# Patient Record
Sex: MALE | Race: WHITE | URBAN - METROPOLITAN AREA
[De-identification: names, ages, dates, MRNs, and addresses within clinical notes are randomized per-mention and may not be internally consistent; named-entity substitution may affect disease eponyms.]

---

## 2022-04-26 ENCOUNTER — TELEPHONE (OUTPATIENT)
Dept: INTERNAL MEDICINE CLINIC | Age: 82
End: 2022-04-26

## 2022-04-26 NOTE — TELEPHONE ENCOUNTER
----- Message from Jeb Jenkins sent at 2022 10:04 AM EDT -----  Subject: Appointment Request    Reason for Call: New Patient Request Appointment    QUESTIONS  Type of Appointment? New Patient/New to Provider  Reason for appointment request? Requested Provider unavailable - Tiffany Ferreira  Additional Information for Provider? Patient would like to establish care   with Dr. Corina Stephen. Stated that he is aware that they would be calling to   schedule a visit with him. Please reach out to patients Daughter In Mechelle Cole to schedule please   ---------------------------------------------------------------------------  --------------  Socii  What is the best way for the office to contact you? OK to leave message on   voicemail  Preferred Call Back Phone Number? [phone]  ---------------------------------------------------------------------------  --------------  SCRIPT ANSWERS  Relationship to Patient? Other  Representative Name? Santa  Additional information verified (besides Name and Date of Birth)? Phone   Number  Specialty Confirmation? Primary Care  Is this the first appointment to establish care for a ? No  Have you been diagnosed with, awaiting test results for, or told that you   are suspected of having COVID-19 (Coronavirus)? (If patient has tested   negative or was tested as a requirement for work, school, or travel and   not based on symptoms, answer no)? No  Within the past 10 days have you developed any of the following symptoms   (answer no if symptoms have been present longer than 10 days or began   more than 10 days ago)? Fever or Chills, Cough, Shortness of breath or   difficulty breathing, Loss of taste or smell, Sore throat, Nasal   congestion, Sneezing or runny nose, Fatigue or generalized body aches   (answer no if pain is specific to a body part e.g. back pain), Diarrhea,   Headache? No  Have you had close contact with someone with COVID-19 in the last 7 days? No  (Service Expert  click yes below to proceed with [employer] As Usual   Scheduling)?  Yes

## 2022-05-16 ENCOUNTER — OFFICE VISIT (OUTPATIENT)
Dept: INTERNAL MEDICINE CLINIC | Age: 82
End: 2022-05-16
Payer: MEDICAID

## 2022-05-16 VITALS
BODY MASS INDEX: 25.77 KG/M2 | SYSTOLIC BLOOD PRESSURE: 123 MMHG | WEIGHT: 174 LBS | HEART RATE: 55 BPM | HEIGHT: 69 IN | DIASTOLIC BLOOD PRESSURE: 64 MMHG

## 2022-05-16 DIAGNOSIS — D50.0 IRON DEFICIENCY ANEMIA DUE TO CHRONIC BLOOD LOSS: ICD-10-CM

## 2022-05-16 DIAGNOSIS — E83.42 HYPOMAGNESEMIA: ICD-10-CM

## 2022-05-16 DIAGNOSIS — G20 PARKINSON DISEASE (HCC): ICD-10-CM

## 2022-05-16 DIAGNOSIS — E78.5 DYSLIPIDEMIA: ICD-10-CM

## 2022-05-16 DIAGNOSIS — Z85.79: ICD-10-CM

## 2022-05-16 DIAGNOSIS — I10 ESSENTIAL HYPERTENSION: Primary | ICD-10-CM

## 2022-05-16 DIAGNOSIS — E53.8 FOLATE DEFICIENCY: ICD-10-CM

## 2022-05-16 PROCEDURE — 99204 OFFICE O/P NEW MOD 45 MIN: CPT | Performed by: HOSPITALIST

## 2022-05-16 RX ORDER — VALSARTAN 160 MG/1
160 TABLET ORAL DAILY
Qty: 90 TABLET | Refills: 1
Start: 2022-05-16

## 2022-05-16 RX ORDER — CARBIDOPA/LEVODOPA 25MG-250MG
1 TABLET ORAL
COMMUNITY

## 2022-05-16 RX ORDER — ROSUVASTATIN CALCIUM 10 MG/1
10 TABLET, COATED ORAL DAILY
COMMUNITY

## 2022-05-16 RX ORDER — AMLODIPINE BESYLATE 5 MG/1
5 TABLET ORAL DAILY
COMMUNITY

## 2022-05-16 RX ORDER — FLUTICASONE PROPIONATE 50 MCG
1 SPRAY, SUSPENSION (ML) NASAL DAILY
COMMUNITY

## 2022-05-16 RX ORDER — LORATADINE 10 MG/1
10 CAPSULE, LIQUID FILLED ORAL DAILY
COMMUNITY

## 2022-05-16 RX ORDER — ESOMEPRAZOLE MAGNESIUM 20 MG/1
20 FOR SUSPENSION ORAL DAILY
COMMUNITY

## 2022-05-16 RX ORDER — EZETIMIBE 10 MG/1
10 TABLET ORAL DAILY
COMMUNITY

## 2022-05-16 RX ORDER — ASPIRIN 81 MG/1
81 TABLET ORAL DAILY
COMMUNITY

## 2022-05-16 SDOH — ECONOMIC STABILITY: FOOD INSECURITY: WITHIN THE PAST 12 MONTHS, THE FOOD YOU BOUGHT JUST DIDN'T LAST AND YOU DIDN'T HAVE MONEY TO GET MORE.: NEVER TRUE

## 2022-05-16 SDOH — ECONOMIC STABILITY: FOOD INSECURITY: WITHIN THE PAST 12 MONTHS, YOU WORRIED THAT YOUR FOOD WOULD RUN OUT BEFORE YOU GOT MONEY TO BUY MORE.: NEVER TRUE

## 2022-05-16 ASSESSMENT — PATIENT HEALTH QUESTIONNAIRE - PHQ9
1. LITTLE INTEREST OR PLEASURE IN DOING THINGS: 0
SUM OF ALL RESPONSES TO PHQ QUESTIONS 1-9: 0
SUM OF ALL RESPONSES TO PHQ QUESTIONS 1-9: 0
2. FEELING DOWN, DEPRESSED OR HOPELESS: 0
SUM OF ALL RESPONSES TO PHQ QUESTIONS 1-9: 0
SUM OF ALL RESPONSES TO PHQ QUESTIONS 1-9: 0
SUM OF ALL RESPONSES TO PHQ9 QUESTIONS 1 & 2: 0

## 2022-05-16 ASSESSMENT — ENCOUNTER SYMPTOMS
BACK PAIN: 1
ALLERGIC/IMMUNOLOGIC NEGATIVE: 1
RESPIRATORY NEGATIVE: 1

## 2022-05-16 ASSESSMENT — SOCIAL DETERMINANTS OF HEALTH (SDOH): HOW HARD IS IT FOR YOU TO PAY FOR THE VERY BASICS LIKE FOOD, HOUSING, MEDICAL CARE, AND HEATING?: NOT HARD AT ALL

## 2022-05-16 NOTE — PROGRESS NOTES
Outpatient Note for New Patient Visit    Patient:  Vincent Goodell                                               : 1940  Age: 81 y.o. MRN: [id number]  Date : 2022    History Obtained From:  patient, family member - son      OF PRESENT ILLNESS:   The patient is a 81 y.o. male who presents establish his medical care in our office. He immigrated 2 months ago from [facility]. He has history of Parkinson's disease which is currently well controlled with use of Sinemet 1 tablet 3 times a day. Blood pressure and dyslipidemia well controlled with current medications. Reports chronic sinus congestion and postnasal drainage secondary to vasomotor rhinitis. His seasonal allergies are better now that he lives in [place] in [place] [address] with his condition while he was in [facility]. Reports mild urinary dribbling and urinary incontinence secondary to previous history of radical prostatectomy for treatment of prostate cancer. Past Medical History:        Diagnosis Date    Acne rosacea     Chronic bilateral low back pain without sciatica     Dyslipidemia     Fatty liver     Kidney stones     [facility]     Primary hypertension     Prostate cancer ([facility]     PUD (peptic ulcer disease)     Varicose veins of bilateral lower extremities with pain     Vasomotor rhinitis        Past Surgical History:        Procedure Laterality Date    PROSTATECTOMY         Family History:   No family history on file. Social History:   TOBACCO:   reports that he quit smoking about 10 years ago. He has a 7.50 pack-year smoking history. He has never used smokeless tobacco.  ETOH:   has no history on file for alcohol use. OCCUPATION:  Retired. , has 1 adult son. Remains active, takes care of his ADLs. Allergies:  Patient has no known allergies. Current Medications:    Prior to Admission medications    Medication Sig Start Date End Date Taking?  Authorizing Provider carbidopa-levodopa (SINEMET)  MG per tablet Take 1 tablet by mouth   Yes Historical Provider, MD   esomeprazole Magnesium (NEXIUM) 20 MG PACK Take 20 mg by mouth daily   Yes Historical Provider, MD   rosuvastatin (CRESTOR) 10 MG tablet Take 10 mg by mouth daily   Yes Historical Provider, MD   ezetimibe (ZETIA) 10 MG tablet Take 10 mg by mouth daily   Yes Historical Provider, MD   BISOPROLOL FUMARATE PO Take by mouth 1.25 mg daily   Yes Historical Provider, MD   aspirin 81 MG EC tablet Take 81 mg by mouth daily   Yes Historical Provider, MD   loratadine (CLARITIN) 10 MG capsule Take 10 mg by mouth daily   Yes Historical Provider, MD   fluticasone (FLONASE) 50 MCG/ACT nasal spray 1 spray by Each Nostril route daily   Yes Historical Provider, MD   benzoyl peroxide 10 % LOTN Apply topically daily as needed   Yes Historical Provider, MD   amLODIPine (NORVASC) 5 MG tablet Take 5 mg by mouth daily   Yes Historical Provider, MD   valsartan (DIOVAN) 160 MG tablet Take 1 tablet by mouth daily 5/16/22  Yes Brinda Brooks MD       REVIEW OF SYSTEMS:  Review of Systems   Constitutional: Negative. HENT: Positive for congestion and postnasal drip. Eyes: Positive for visual disturbance (bilateral cataracts). Respiratory: Negative. Cardiovascular: Negative. Gastrointestinal:        Some heartburn   Endocrine: Negative. Genitourinary:        Mild urine incontinence as a consequence of radical prostatectomy   Musculoskeletal: Positive for back pain (chronic lower back and lower T-spine pain) and myalgias (calves. Takes Mg Oxide 400-800 mg PO daily as needed with good symptom relief.). Takes Tylenol 3 as needed for severe lower back pain   Skin:        + skin erythema secondary to acne rosacea and mycosis fungoides. Was receiving phototherapy in [facility] since 2012   Allergic/Immunologic: Negative. Neurological: Positive for tremors. Psychiatric/Behavioral: Negative.           Physical Exam: Vitals: /64   Pulse 55   Ht 5' 9.29\" (1.76 m)   Wt 174 lb (78.9 kg)   BMI 25.48 kg/m²     Body mass index is 25.48 kg/m². Wt Readings from Last 3 Encounters:   05/16/22 174 lb (78.9 kg)     Physical Exam  Vitals and nursing note reviewed. Constitutional:       General: He is not in acute distress. Appearance: Normal appearance. He is well-developed. He is not ill-appearing. HENT:      Head: Normocephalic and atraumatic. Right Ear: Tympanic membrane, ear canal and external ear normal. There is no impacted cerumen. Left Ear: Tympanic membrane, ear canal and external ear normal. There is no impacted cerumen. Nose:      Comments: Nasal is moderately swollen and mildly inflamed. Mouth/Throat:      Pharynx: No oropharyngeal exudate. Eyes:      General: No scleral icterus. Extraocular Movements: Extraocular movements intact. Pupils: Pupils are equal, round, and reactive to light. Neck:      Vascular: No JVD. Cardiovascular:      Rate and Rhythm: Normal rate and regular rhythm. Heart sounds: Normal heart sounds. No murmur heard. No friction rub. No gallop. Pulmonary:      Effort: Pulmonary effort is normal. No respiratory distress. Breath sounds: Normal breath sounds. No wheezing or rales. Chest:      Chest wall: No tenderness. Abdominal:      General: Bowel sounds are normal. There is no distension. Palpations: Abdomen is soft. Tenderness: There is no abdominal tenderness. There is no right CVA tenderness or left CVA tenderness. Musculoskeletal:         General: Normal range of motion. Cervical back: Normal range of motion. Right lower leg: No edema. Left lower leg: No edema. Skin:     General: Skin is warm and dry. Findings: Erythema (face, chest, and back) present. No lesion or rash. Neurological:      Mental Status: He is alert and oriented to person, place, and time.       Cranial Nerves: No cranial nerve deficit. Sensory: No sensory deficit. Assessment/Plan:   Jose Gao was seen today for new patient. Diagnoses and all orders for this visit:    Essential hypertension  -     CBC with Auto Differential; Future  -     Comprehensive Metabolic Panel; Future  -     Lipid Panel; Future  -     TSH; Future  -     Continue current medication    Dyslipidemia        -     Continue current medication        -     Lipid panel; Future    Hypomagnesemia  -     Magnesium; Future    Iron deficiency anemia due to chronic blood loss  -     Iron and TIBC; Future    Folate deficiency  -     Folate; Future    [facility]        -    Continue current medication    H/O mycosis fungoides        -    We will need to have dermatology evaluation for further evaluation and treat    Other orders  -     valsartan (DIOVAN) 160 MG tablet; Take 1 tablet by mouth daily            Return in about 3 months (around 8/16/2022) for HTN, dyslipidemia.      Judith Patterson MD, M.BECKI.   5/16/2022, 2:58 PM

## 2022-08-09 DIAGNOSIS — D50.0 IRON DEFICIENCY ANEMIA DUE TO CHRONIC BLOOD LOSS: ICD-10-CM

## 2022-08-09 DIAGNOSIS — E53.8 FOLATE DEFICIENCY: ICD-10-CM

## 2022-08-09 DIAGNOSIS — I10 ESSENTIAL HYPERTENSION: ICD-10-CM

## 2022-08-09 DIAGNOSIS — E83.42 HYPOMAGNESEMIA: ICD-10-CM

## 2022-08-09 LAB
BASOPHILS ABSOLUTE: 0 K/UL (ref 0–0.2)
BASOPHILS RELATIVE PERCENT: 0.2 %
EOSINOPHILS ABSOLUTE: 0.4 K/UL (ref 0–0.6)
EOSINOPHILS RELATIVE PERCENT: 4.6 %
FOLATE: 5.2 NG/ML (ref 4.78–24.2)
HCT VFR BLD CALC: 44.2 % (ref 40.5–52.5)
HEMOGLOBIN: 14.9 G/DL (ref 13.5–17.5)
IRON SATURATION: 19 % (ref 20–50)
IRON: 71 UG/DL (ref 59–158)
LYMPHOCYTES ABSOLUTE: 2.4 K/UL (ref 1–5.1)
LYMPHOCYTES RELATIVE PERCENT: 31.1 %
MAGNESIUM: 2.2 MG/DL (ref 1.8–2.4)
MCH RBC QN AUTO: 31.5 PG (ref 26–34)
MCHC RBC AUTO-ENTMCNC: 33.7 G/DL (ref 31–36)
MCV RBC AUTO: 93.3 FL (ref 80–100)
MONOCYTES ABSOLUTE: 0.5 K/UL (ref 0–1.3)
MONOCYTES RELATIVE PERCENT: 6.6 %
NEUTROPHILS ABSOLUTE: 4.4 K/UL (ref 1.7–7.7)
NEUTROPHILS RELATIVE PERCENT: 57.5 %
PDW BLD-RTO: 13.5 % (ref 12.4–15.4)
PLATELET # BLD: 261 K/UL (ref 135–450)
PMV BLD AUTO: 8 FL (ref 5–10.5)
RBC # BLD: 4.73 M/UL (ref 4.2–5.9)
TOTAL IRON BINDING CAPACITY: 372 UG/DL (ref 260–445)
WBC # BLD: 7.7 K/UL (ref 4–11)

## 2022-08-10 LAB
A/G RATIO: 1.7 (ref 1.1–2.2)
ALBUMIN SERPL-MCNC: 4.3 G/DL (ref 3.4–5)
ALP BLD-CCNC: 84 U/L (ref 40–129)
ALT SERPL-CCNC: 29 U/L (ref 10–40)
ANION GAP SERPL CALCULATED.3IONS-SCNC: 14 MMOL/L (ref 3–16)
AST SERPL-CCNC: 17 U/L (ref 15–37)
BILIRUB SERPL-MCNC: <0.2 MG/DL (ref 0–1)
BUN BLDV-MCNC: 23 MG/DL (ref 7–20)
CALCIUM SERPL-MCNC: 9.6 MG/DL (ref 8.3–10.6)
CHLORIDE BLD-SCNC: 102 MMOL/L (ref 99–110)
CHOLESTEROL, TOTAL: 138 MG/DL (ref 0–199)
CO2: 26 MMOL/L (ref 21–32)
CREAT SERPL-MCNC: 1.2 MG/DL (ref 0.8–1.3)
GFR AFRICAN AMERICAN: >60
GFR NON-AFRICAN AMERICAN: 58
GLUCOSE BLD-MCNC: 103 MG/DL (ref 70–99)
HDLC SERPL-MCNC: 47 MG/DL (ref 40–60)
LDL CHOLESTEROL CALCULATED: 60 MG/DL
POTASSIUM SERPL-SCNC: 5.1 MMOL/L (ref 3.5–5.1)
SODIUM BLD-SCNC: 142 MMOL/L (ref 136–145)
TOTAL PROTEIN: 6.8 G/DL (ref 6.4–8.2)
TRIGL SERPL-MCNC: 155 MG/DL (ref 0–150)
TSH SERPL DL<=0.05 MIU/L-ACNC: 2.95 UIU/ML (ref 0.27–4.2)
VLDLC SERPL CALC-MCNC: 31 MG/DL

## 2024-02-27 ENCOUNTER — OFFICE VISIT (OUTPATIENT)
Dept: INTERNAL MEDICINE CLINIC | Age: 84
End: 2024-02-27
Payer: MEDICAID

## 2024-02-27 VITALS
OXYGEN SATURATION: 98 % | SYSTOLIC BLOOD PRESSURE: 106 MMHG | TEMPERATURE: 98.5 F | DIASTOLIC BLOOD PRESSURE: 64 MMHG | BODY MASS INDEX: 27.25 KG/M2 | HEART RATE: 67 BPM | HEIGHT: 67 IN

## 2024-02-27 DIAGNOSIS — D50.8 OTHER IRON DEFICIENCY ANEMIA: ICD-10-CM

## 2024-02-27 DIAGNOSIS — E55.9 VITAMIN D DEFICIENCY: ICD-10-CM

## 2024-02-27 DIAGNOSIS — I10 ESSENTIAL HYPERTENSION: ICD-10-CM

## 2024-02-27 DIAGNOSIS — G89.29 CHRONIC BILATERAL LOW BACK PAIN WITH RIGHT-SIDED SCIATICA: ICD-10-CM

## 2024-02-27 DIAGNOSIS — G20.A1 PARKINSON'S DISEASE WITHOUT DYSKINESIA OR FLUCTUATING MANIFESTATIONS: ICD-10-CM

## 2024-02-27 DIAGNOSIS — E53.8 FOLATE DEFICIENCY: ICD-10-CM

## 2024-02-27 DIAGNOSIS — R73.9 HYPERGLYCEMIA: ICD-10-CM

## 2024-02-27 DIAGNOSIS — D23.30 DERMOID CYST OF FACE: ICD-10-CM

## 2024-02-27 DIAGNOSIS — E83.42 HYPOMAGNESEMIA: ICD-10-CM

## 2024-02-27 DIAGNOSIS — M54.41 CHRONIC BILATERAL LOW BACK PAIN WITH RIGHT-SIDED SCIATICA: ICD-10-CM

## 2024-02-27 DIAGNOSIS — Z00.00 INITIAL MEDICARE ANNUAL WELLNESS VISIT: Primary | ICD-10-CM

## 2024-02-27 PROCEDURE — G0438 PPPS, INITIAL VISIT: HCPCS | Performed by: HOSPITALIST

## 2024-02-27 PROCEDURE — G8484 FLU IMMUNIZE NO ADMIN: HCPCS | Performed by: HOSPITALIST

## 2024-02-27 PROCEDURE — 1123F ACP DISCUSS/DSCN MKR DOCD: CPT | Performed by: HOSPITALIST

## 2024-02-27 PROCEDURE — 3074F SYST BP LT 130 MM HG: CPT | Performed by: HOSPITALIST

## 2024-02-27 PROCEDURE — 3078F DIAST BP <80 MM HG: CPT | Performed by: HOSPITALIST

## 2024-02-27 RX ORDER — DOXYCYCLINE HYCLATE 100 MG
100 TABLET ORAL 2 TIMES DAILY
Qty: 20 TABLET | Refills: 1 | Status: SHIPPED | OUTPATIENT
Start: 2024-02-27 | End: 2024-03-18

## 2024-02-27 RX ORDER — TRAMADOL HYDROCHLORIDE 50 MG/1
50 TABLET ORAL 2 TIMES DAILY PRN
Qty: 20 TABLET | Refills: 0 | Status: SHIPPED | OUTPATIENT
Start: 2024-02-27 | End: 2024-03-08

## 2024-02-27 ASSESSMENT — PATIENT HEALTH QUESTIONNAIRE - PHQ9
SUM OF ALL RESPONSES TO PHQ QUESTIONS 1-9: 0
2. FEELING DOWN, DEPRESSED OR HOPELESS: 0
1. LITTLE INTEREST OR PLEASURE IN DOING THINGS: 0
SUM OF ALL RESPONSES TO PHQ QUESTIONS 1-9: 0
SUM OF ALL RESPONSES TO PHQ QUESTIONS 1-9: 0
SUM OF ALL RESPONSES TO PHQ9 QUESTIONS 1 & 2: 0

## 2024-02-27 ASSESSMENT — LIFESTYLE VARIABLES: HOW OFTEN DO YOU HAVE A DRINK CONTAINING ALCOHOL: MONTHLY OR LESS

## 2024-02-27 NOTE — PROGRESS NOTES
and atraumatic  Eyes: pupils equal, round, and reactive to light, extraocular eye movements intact, conjunctivae normal  ENT: tympanic membrane, external ear and ear canal normal bilaterally, nose without deformity, nasal mucosa and turbinates normal without polyps  Neck: supple and non-tender without mass, no thyromegaly or thyroid nodules, no cervical lymphadenopathy  Pulmonary/Chest: clear to auscultation bilaterally- no wheezes, rales or rhonchi, normal air movement, no respiratory distress  Cardiovascular: normal rate, regular rhythm, normal S1 and S2, no murmurs, rubs, clicks, or gallops, distal pulses intact, no carotid bruits  Abdomen: soft, non-tender, non-distended, normal bowel sounds, no masses or organomegaly  Extremities: no cyanosis, clubbing or edema  Musculoskeletal: normal range of motion, no joint swelling, deformity or tenderness  Neurologic: reflexes normal and symmetric, no cranial nerve deficit, speech normal.  The patient appears to have some gait instability and limping on his right lower extremity.      No Known Allergies  Prior to Visit Medications    Medication Sig Taking? Authorizing Provider   doxycycline hyclate (VIBRA-TABS) 100 MG tablet Take 1 tablet by mouth 2 times daily for 20 days Yes Clint Caldwell MD   traMADol (ULTRAM) 50 MG tablet Take 1 tablet by mouth 2 times daily as needed for Pain for up to 10 days. Intended supply: 5 days. Take lowest dose possible to manage pain Max Daily Amount: 100 mg Yes Clint Caldwell MD   carbidopa-levodopa (SINEMET)  MG per tablet Take 1 tablet by mouth Yes Olivia Shaikh MD   esomeprazole Magnesium (NEXIUM) 20 MG PACK Take 1 packet by mouth daily Yes Olivia Shaikh MD   rosuvastatin (CRESTOR) 10 MG tablet Take 1 tablet by mouth daily Yes Olivia Shaikh MD   ezetimibe (ZETIA) 10 MG tablet Take 1 tablet by mouth daily Yes Olivia Shaikh MD   BISOPROLOL FUMARATE PO Take by mouth 1.25 mg daily Yes Neel

## 2024-04-09 DIAGNOSIS — R73.9 HYPERGLYCEMIA: ICD-10-CM

## 2024-04-09 DIAGNOSIS — E53.8 FOLATE DEFICIENCY: ICD-10-CM

## 2024-04-09 DIAGNOSIS — I10 ESSENTIAL HYPERTENSION: ICD-10-CM

## 2024-04-09 DIAGNOSIS — E83.42 HYPOMAGNESEMIA: ICD-10-CM

## 2024-04-09 DIAGNOSIS — E55.9 VITAMIN D DEFICIENCY: ICD-10-CM

## 2024-04-09 DIAGNOSIS — D50.8 OTHER IRON DEFICIENCY ANEMIA: ICD-10-CM

## 2024-04-09 LAB
25(OH)D3 SERPL-MCNC: 33.2 NG/ML
ALBUMIN SERPL-MCNC: 4.4 G/DL (ref 3.4–5)
ALBUMIN/GLOB SERPL: 1.6 {RATIO} (ref 1.1–2.2)
ALP SERPL-CCNC: 84 U/L (ref 40–129)
ALT SERPL-CCNC: 29 U/L (ref 10–40)
ANION GAP SERPL CALCULATED.3IONS-SCNC: 10 MMOL/L (ref 3–16)
AST SERPL-CCNC: 22 U/L (ref 15–37)
BASOPHILS # BLD: 0.1 K/UL (ref 0–0.2)
BASOPHILS NFR BLD: 0.7 %
BILIRUB SERPL-MCNC: 0.5 MG/DL (ref 0–1)
BUN SERPL-MCNC: 18 MG/DL (ref 7–20)
CALCIUM SERPL-MCNC: 9.8 MG/DL (ref 8.3–10.6)
CHLORIDE SERPL-SCNC: 105 MMOL/L (ref 99–110)
CHOLEST SERPL-MCNC: 160 MG/DL (ref 0–199)
CO2 SERPL-SCNC: 27 MMOL/L (ref 21–32)
CREAT SERPL-MCNC: 1 MG/DL (ref 0.8–1.3)
DEPRECATED RDW RBC AUTO: 12.7 % (ref 12.4–15.4)
EOSINOPHIL # BLD: 0.4 K/UL (ref 0–0.6)
EOSINOPHIL NFR BLD: 4.6 %
FERRITIN SERPL IA-MCNC: 149.6 NG/ML (ref 30–400)
FOLATE SERPL-MCNC: 18.14 NG/ML (ref 4.78–24.2)
GFR SERPLBLD CREATININE-BSD FMLA CKD-EPI: 75 ML/MIN/{1.73_M2}
GLUCOSE SERPL-MCNC: 111 MG/DL (ref 70–99)
HCT VFR BLD AUTO: 44.3 % (ref 40.5–52.5)
HDLC SERPL-MCNC: 56 MG/DL (ref 40–60)
HGB BLD-MCNC: 15.7 G/DL (ref 13.5–17.5)
IRON SATN MFR SERPL: 31 % (ref 20–50)
IRON SERPL-MCNC: 98 UG/DL (ref 59–158)
LDLC SERPL CALC-MCNC: 75 MG/DL
LYMPHOCYTES # BLD: 2.2 K/UL (ref 1–5.1)
LYMPHOCYTES NFR BLD: 27.9 %
MAGNESIUM SERPL-MCNC: 2.1 MG/DL (ref 1.8–2.4)
MCH RBC QN AUTO: 33.1 PG (ref 26–34)
MCHC RBC AUTO-ENTMCNC: 35.3 G/DL (ref 31–36)
MCV RBC AUTO: 93.5 FL (ref 80–100)
MONOCYTES # BLD: 0.4 K/UL (ref 0–1.3)
MONOCYTES NFR BLD: 5.6 %
NEUTROPHILS # BLD: 4.8 K/UL (ref 1.7–7.7)
NEUTROPHILS NFR BLD: 61.2 %
PLATELET # BLD AUTO: 264 K/UL (ref 135–450)
PMV BLD AUTO: 8 FL (ref 5–10.5)
POTASSIUM SERPL-SCNC: 5.3 MMOL/L (ref 3.5–5.1)
PROT SERPL-MCNC: 7.2 G/DL (ref 6.4–8.2)
RBC # BLD AUTO: 4.74 M/UL (ref 4.2–5.9)
SODIUM SERPL-SCNC: 142 MMOL/L (ref 136–145)
TIBC SERPL-MCNC: 320 UG/DL (ref 260–445)
TRIGL SERPL-MCNC: 146 MG/DL (ref 0–150)
TSH SERPL DL<=0.005 MIU/L-ACNC: 1.99 UIU/ML (ref 0.27–4.2)
VIT B12 SERPL-MCNC: 669 PG/ML (ref 211–911)
VLDLC SERPL CALC-MCNC: 29 MG/DL
WBC # BLD AUTO: 7.8 K/UL (ref 4–11)

## 2024-04-10 LAB
EST. AVERAGE GLUCOSE BLD GHB EST-MCNC: 111.2 MG/DL
HBA1C MFR BLD: 5.5 %

## 2024-04-29 ENCOUNTER — TELEPHONE (OUTPATIENT)
Dept: INTERNAL MEDICINE CLINIC | Age: 84
End: 2024-04-29

## 2024-04-29 NOTE — TELEPHONE ENCOUNTER
Tramadol  (Newest Message First)  View All Conversations on this Encounter  Brien [facility]UEVARA [facility] (supporting Clint Caldwell MD)15 hours ago (7:48 PM)     [facility] Dr. Caldwell,   We have two questions:      1:  At the last visit, you prescribed Tramadol for Brien's back pain.   It has been helping for breakthrough pain, but he has run out of the medication at this time.   (For some reason, this medication is not in Chicho's list of medications here in [facility].)  Can you please prescribe him Tramadol again? Another 30 pills should last him for a while.      2:   You referred Brien to see Dr. Dolan of neurosurgery. We tried to make an appointment, and we were told that Chicho's insurance requires him to come to the first appointment with an MRI of the spine or a CT of the spine already done. They could not schedule this first appointment without imaging.  Please refer him for imaging.      Thank you.

## 2024-05-07 ENCOUNTER — PATIENT MESSAGE (OUTPATIENT)
Dept: INTERNAL MEDICINE CLINIC | Age: 84
End: 2024-05-07

## 2024-05-07 ENCOUNTER — OFFICE VISIT (OUTPATIENT)
Dept: INTERNAL MEDICINE CLINIC | Age: 84
End: 2024-05-07
Payer: MEDICAID

## 2024-05-07 ENCOUNTER — HOSPITAL ENCOUNTER (OUTPATIENT)
Dept: GENERAL RADIOLOGY | Age: 84
Discharge: HOME | End: 2024-05-07
Payer: MEDICAID

## 2024-05-07 VITALS
WEIGHT: 174 LBS | HEIGHT: 67 IN | DIASTOLIC BLOOD PRESSURE: 80 MMHG | SYSTOLIC BLOOD PRESSURE: 170 MMHG | BODY MASS INDEX: 27.31 KG/M2

## 2024-05-07 DIAGNOSIS — M54.50 CHRONIC BILATERAL LOW BACK PAIN WITHOUT SCIATICA: Primary | ICD-10-CM

## 2024-05-07 DIAGNOSIS — M54.41 CHRONIC BILATERAL LOW BACK PAIN WITH RIGHT-SIDED SCIATICA: ICD-10-CM

## 2024-05-07 DIAGNOSIS — I10 ESSENTIAL HYPERTENSION: ICD-10-CM

## 2024-05-07 DIAGNOSIS — G89.29 CHRONIC BILATERAL LOW BACK PAIN WITHOUT SCIATICA: ICD-10-CM

## 2024-05-07 DIAGNOSIS — M54.50 CHRONIC BILATERAL LOW BACK PAIN WITHOUT SCIATICA: ICD-10-CM

## 2024-05-07 DIAGNOSIS — G89.29 CHRONIC BILATERAL LOW BACK PAIN WITH RIGHT-SIDED SCIATICA: ICD-10-CM

## 2024-05-07 DIAGNOSIS — G89.29 CHRONIC BILATERAL LOW BACK PAIN WITHOUT SCIATICA: Primary | ICD-10-CM

## 2024-05-07 PROCEDURE — 3079F DIAST BP 80-89 MM HG: CPT | Performed by: HOSPITALIST

## 2024-05-07 PROCEDURE — G8427 DOCREV CUR MEDS BY ELIG CLIN: HCPCS | Performed by: HOSPITALIST

## 2024-05-07 PROCEDURE — G8419 CALC BMI OUT NRM PARAM NOF/U: HCPCS | Performed by: HOSPITALIST

## 2024-05-07 PROCEDURE — 99214 OFFICE O/P EST MOD 30 MIN: CPT | Performed by: HOSPITALIST

## 2024-05-07 PROCEDURE — 3077F SYST BP >= 140 MM HG: CPT | Performed by: HOSPITALIST

## 2024-05-07 PROCEDURE — 1123F ACP DISCUSS/DSCN MKR DOCD: CPT | Performed by: HOSPITALIST

## 2024-05-07 PROCEDURE — 72100 X-RAY EXAM L-S SPINE 2/3 VWS: CPT

## 2024-05-07 PROCEDURE — 1036F TOBACCO NON-USER: CPT | Performed by: HOSPITALIST

## 2024-05-07 RX ORDER — VALSARTAN 160 MG/1
160 TABLET ORAL DAILY
Qty: 90 TABLET | Refills: 1 | Status: SHIPPED | OUTPATIENT
Start: 2024-05-07

## 2024-05-07 RX ORDER — TRAMADOL HYDROCHLORIDE 50 MG/1
50 TABLET ORAL 2 TIMES DAILY PRN
Qty: 20 TABLET | Refills: 0 | Status: SHIPPED | OUTPATIENT
Start: 2024-05-07 | End: 2024-05-17

## 2024-05-07 ASSESSMENT — PATIENT HEALTH QUESTIONNAIRE - PHQ9
SUM OF ALL RESPONSES TO PHQ9 QUESTIONS 1 & 2: 0
SUM OF ALL RESPONSES TO PHQ QUESTIONS 1-9: 0
2. FEELING DOWN, DEPRESSED OR HOPELESS: NOT AT ALL
SUM OF ALL RESPONSES TO PHQ QUESTIONS 1-9: 0
1. LITTLE INTEREST OR PLEASURE IN DOING THINGS: NOT AT ALL
SUM OF ALL RESPONSES TO PHQ QUESTIONS 1-9: 0
SUM OF ALL RESPONSES TO PHQ QUESTIONS 1-9: 0

## 2024-05-07 NOTE — PROGRESS NOTES
Follow Up Visit Established Patient Visit    Patient:  Brien Lombardo                                               : 1940  Age: 84 y.o.  MRN: [id number]  Date : 2024    Brien Lombardo is a 84 y.o. male who presents for : Evaluation of chronic lower back pain    Chief Complaint   Patient presents with    Back Pain     Needs MRI ordered     Has longstanding problem with chronic bilateral lower back pain.  Had an extensive evaluation in [place] in  which was consistent with lumbar spinal stenosis.  Pain is mild-moderate intensity, chronic in nature.  The patient also reports chronic pain and weakness in his both calfs.  Pain is worse after walking about 300 yards distance.  Despite that he continues to stay active, takes care of his ADLs.  Tries to walk 1-2 miles daily despite bilateral leg and lower back discomfort.  Takes tramadol as needed with reasonable symptom relief.  Blood pressure has been somewhat elevated and ranging between 140 to 160 mmHg in the last month    Past Medical History:   Diagnosis Date    Acne rosacea     Chronic bilateral low back pain without sciatica     Dyslipidemia     Fatty liver     Kidney stones     Parkinson disease (HCC)     Primary hypertension     Prostate cancer (HCC)     PUD (peptic ulcer disease)     Varicose veins of bilateral lower extremities with pain     Vasomotor rhinitis        Past Surgical History:   Procedure Laterality Date    PROSTATECTOMY         Current Outpatient Medications   Medication Sig Dispense Refill    valsartan (DIOVAN) 160 MG tablet Take 1 tablet by mouth daily 90 tablet 1    carbidopa-levodopa (SINEMET)  MG per tablet Take 1 tablet by mouth      esomeprazole Magnesium (NEXIUM) 20 MG PACK Take 1 packet by mouth daily      rosuvastatin (CRESTOR) 10 MG tablet Take 1 tablet by mouth daily      ezetimibe (ZETIA) 10 MG tablet Take 1 tablet by mouth daily      aspirin 81 MG EC tablet Take 1 tablet by mouth daily      loratadine

## 2024-05-07 NOTE — TELEPHONE ENCOUNTER
From: Brien Lombardo  To: Dr. Clint Caldwell  Sent: 5/7/2024 11:41 AM EDT  Subject: Tramadol    Hi Dr. Caldwell,   After the appointment this morning, we just realized that we forgot to ask you for the Tramadol prescription for back pain.   Can you please send it to the pharmacy at [facility] on [address]   Thank you.

## 2024-05-29 ENCOUNTER — HOSPITAL ENCOUNTER (OUTPATIENT)
Dept: PHYSICAL THERAPY | Age: 84
Setting detail: THERAPIES SERIES
Discharge: HOME | End: 2024-05-29
Payer: MEDICAID

## 2024-05-29 PROCEDURE — 97110 THERAPEUTIC EXERCISES: CPT

## 2024-05-29 PROCEDURE — 97161 PT EVAL LOW COMPLEX 20 MIN: CPT

## 2024-05-29 NOTE — PLAN OF CARE
tolerance, in order to prevent re-injury.   [] Progressing: [] Met: [] Not Met: [] Adjusted  2. Patient will have a decrease in pain to <***/10 to facilitate improvement in movement, function, and ADLs as indicated by Functional Deficits.  [] Progressing: [] Met: [] Not Met: [] Adjusted    Long Term Goals: To be achieved in: *** weeks  1. Disability index score of ***% or less for the {outcome measures:40008} to assist with reaching prior level of function with activities such as ***.  [] Progressing: [] Met: [] Not Met: [] Adjusted  2. Patient will demonstrate increased AROM of *** to *** without pain to allow for proper joint functioning to enable patient to ***.   [] Progressing: [] Met: [] Not Met: [] Adjusted  3. Patient will demonstrate increased Strength of *** to {STRENGTHGOAL:57588} to allow for proper functional mobility to enable patient to return to ***.   [] Progressing: [] Met: [] Not Met: [] Adjusted  4. Patient will return to *** without increased symptoms or restriction.   [] Progressing: [] Met: [] Not Met: [] Adjusted  5. ***(patient specific functional goal)    [] Progressing: [] Met: [] Not Met: [] Adjusted     Overall Progression Towards Functional goals/ Treatment Progress Update:  [] Patient is progressing as expected towards functional goals listed.    [] Progression is slowed due to complexities/Impairments listed.  [] Progression has been slowed due to co-morbidities.  [x] Plan just implemented, too soon (<30days) to assess goals progression   [] Goals require adjustment due to lack of progress  [] Patient is not progressing as expected and requires additional follow up with physician  [] Other:     TREATMENT PLAN     Frequency/Duration: {POC Frequecy:63635}/week for {weeks:31287} weeks for the following treatment interventions:    Interventions:  {PT INTERVENTIONS:26959}    Plan: {vouz7adik:44103::\"POC initiated as per evaluation\"}    Electronically Signed by Deborah Sky, PT  Date:

## 2024-06-03 ENCOUNTER — HOSPITAL ENCOUNTER (OUTPATIENT)
Dept: PHYSICAL THERAPY | Age: 84
Setting detail: THERAPIES SERIES
Discharge: HOME | End: 2024-06-03
Payer: MEDICAID

## 2024-06-03 PROCEDURE — 97140 MANUAL THERAPY 1/> REGIONS: CPT

## 2024-06-03 PROCEDURE — 97110 THERAPEUTIC EXERCISES: CPT

## 2024-06-03 NOTE — FLOWSHEET NOTE
Progressing: [] Met: [] Not Met: [] Adjusted  2. Patient will have a decrease in pain to </10 to facilitate improvement in movement, function, and ADLs as indicated by Functional Deficits.  [] Progressing: [] Met: [] Not Met: [] Adjusted    Long Term Goals: To be achieved in: 12 weeks  1. Disability index score of 15% or less for the Modified Oswestry to assist with reaching prior level of function with activities such as walking >15 min.  [] Progressing: [] Met: [] Not Met: [] Adjusted  2. Patient will demonstrate increased AROM of lumbar and hip to WFL without pain to allow for proper joint functioning to enable patient to navigate ADL.   [] Progressing: [] Met: [] Not Met: [] Adjusted  3. Patient will demonstrate increased Strength of hip musculature to at least 5/5 throughout without pain to allow for proper functional mobility to enable patient to return to ascending and descending steps.   [] Progressing: [] Met: [] Not Met: [] Adjusted  4. Patient will return to modified ADL without increased symptoms or restriction.   [] Progressing: [] Met: [] Not Met: [] Adjusted  5. Pt will exhibit improved balance of SLS >15 sec bilat. (patient specific functional goal)    [] Progressing: [] Met: [] Not Met: [] Adjusted     Overall Progression Towards Functional goals/ Treatment Progress Update:  [] Patient is progressing as expected towards functional goals listed.    [] Progression is slowed due to complexities/Impairments listed.  [] Progression has been slowed due to co-morbidities.  [x] Plan just implemented, too soon (<30days) to assess goals progression   [] Goals require adjustment due to lack of progress  [] Patient is not progressing as expected and requires additional follow up with physician  [] Other:     TREATMENT PLAN       Plan: Cont POC- Continue emphasis/focus on exercise progression, improving proper muscle recruitment and activation/motor control patterns, modulating pain, and promoting relaxation.

## 2024-06-10 ENCOUNTER — HOSPITAL ENCOUNTER (OUTPATIENT)
Dept: PHYSICAL THERAPY | Age: 84
Setting detail: THERAPIES SERIES
Discharge: HOME | End: 2024-06-10
Payer: MEDICAID

## 2024-06-10 PROCEDURE — 97012 MECHANICAL TRACTION THERAPY: CPT

## 2024-06-10 PROCEDURE — 97110 THERAPEUTIC EXERCISES: CPT

## 2024-06-10 PROCEDURE — 97140 MANUAL THERAPY 1/> REGIONS: CPT

## 2024-06-10 NOTE — FLOWSHEET NOTE
[facility]- Outpatient Rehabilitation and Therapy [address], [place] [address] office: [phone] fax: [phone]         Physical Therapy: TREATMENT/PROGRESS NOTE   Patient: Brien Lombardo (84 y.o. male)   Examination Date: 06/10/2024   :  1940 MRN: [id number]   Visit #: 3   Insurance Allowable Auth Needed   Med nec []Yes    [x]No    Insurance: Payor: [employer] [place] MEDICAID / Plan: [employer] [place] MEDICA / Product Type: *No Product type* /   Insurance ID: [id number] - (Medicaid Managed)  Secondary Insurance (if applicable):    Treatment Diagnosis:     ICD-10-CM    1. Chronic bilateral low back pain, unspecified whether sciatica present  M54.50     G89.29          Medical Diagnosis:  Chronic bilateral low back pain without sciatica [M54.50, G89.29]   Referring Physician: Clint Caldwell MD  PCP: Clint Caldwell MD     Plan of care signed (Y/N):     Date of Patient follow up with Physician: N     Progress Report/POC: NO  POC update due: (10 visits /OR AUTH LIMITS, whichever is less)  7/3/2024                                             Precautions/ Contra-indications:           Latex allergy:  NO  Pacemaker:    NO  Contraindications for Manipulation: unhealthy/ multiple comorbidities   Date of Surgery: n/a  Other:    Red Flags:  None    C-SSRS Triggered by Intake questionnaire:   Patient answered 'NO' to both behavioral questions on intake.  No further screening warranted    Preferred Language for Healthcare:   [] English       [x] other: Sammarinese  Interpretation provided by: Caridad  ID number: [id number]  For verbal instructions and entire follow up session  Waiver signed by family member/caregiver: no      SUBJECTIVE EXAMINATION     Patient stated complaint: Pt reports his back feels the same, pain is near TL junction (pt points to this spot). He does also feel pain in low lumbar region.        Test used Initial score  5/29/24 06/10/2024   Pain Summary

## 2024-06-12 ENCOUNTER — HOSPITAL ENCOUNTER (OUTPATIENT)
Dept: PHYSICAL THERAPY | Age: 84
Setting detail: THERAPIES SERIES
Discharge: HOME | End: 2024-06-12
Payer: MEDICAID

## 2024-06-12 PROCEDURE — 97012 MECHANICAL TRACTION THERAPY: CPT

## 2024-06-12 PROCEDURE — 97110 THERAPEUTIC EXERCISES: CPT

## 2024-06-12 PROCEDURE — 97140 MANUAL THERAPY 1/> REGIONS: CPT

## 2024-06-12 NOTE — FLOWSHEET NOTE
proprioception for sitting and/or standing activities  (34162) MANUAL THERAPY -  Manual therapy techniques, 1 or more regions, each 15 minutes (Mobilization/manipulation, manual lymphatic drainage, manual traction) for the purpose of modulating pain, promoting relaxation,  increasing ROM, reducing/eliminating soft tissue swelling/inflammation/restriction, improving soft tissue extensibility and allowing for proper ROM for normal function with self care, mobility, lifting and ambulation  (18411) MECHANICAL TRACTION    GOALS     Patient stated goal: balance better, decrease pain  [x] Progressing: [] Met: [] Not Met: [] Adjusted    Therapist goals for Patient:   Short Term Goals: To be achieved in: 2 weeks  1. Independent in HEP and progression per patient tolerance, in order to prevent re-injury.   [x] Progressing: [] Met: [] Not Met: [] Adjusted  2. Patient will have a decrease in pain to </10 to facilitate improvement in movement, function, and ADLs as indicated by Functional Deficits.  [x] Progressing: [] Met: [] Not Met: [] Adjusted    Long Term Goals: To be achieved in: 12 weeks  1. Disability index score of 15% or less for the Modified Oswestry to assist with reaching prior level of function with activities such as walking >15 min.  [x] Progressing: [] Met: [] Not Met: [] Adjusted  2. Patient will demonstrate increased AROM of lumbar and hip to WFL without pain to allow for proper joint functioning to enable patient to navigate ADL.   [x] Progressing: [] Met: [] Not Met: [] Adjusted  3. Patient will demonstrate increased Strength of hip musculature to at least 5/5 throughout without pain to allow for proper functional mobility to enable patient to return to ascending and descending steps.   [x] Progressing: [] Met: [] Not Met: [] Adjusted  4. Patient will return to modified ADL without increased symptoms or restriction.   [x] Progressing: [] Met: [] Not Met: [] Adjusted  5. Pt will exhibit improved balance of

## 2024-06-17 ENCOUNTER — HOSPITAL ENCOUNTER (OUTPATIENT)
Dept: PHYSICAL THERAPY | Age: 84
Setting detail: THERAPIES SERIES
Discharge: HOME | End: 2024-06-17
Payer: MEDICAID

## 2024-06-17 PROCEDURE — 97012 MECHANICAL TRACTION THERAPY: CPT

## 2024-06-17 PROCEDURE — 97110 THERAPEUTIC EXERCISES: CPT

## 2024-06-17 PROCEDURE — 97140 MANUAL THERAPY 1/> REGIONS: CPT

## 2024-06-17 NOTE — FLOWSHEET NOTE
[facility]- Outpatient Rehabilitation and Therapy [address], [place] [address] office: [phone] fax: [phone]         Physical Therapy: TREATMENT/PROGRESS NOTE   Patient: Brien Lombardo (84 y.o. male)   Examination Date: 2024   :  1940 MRN: [id number]   Visit #: 5   Insurance Allowable Auth Needed   Med nec []Yes    [x]No    Insurance: Payor: [employer] [place] MEDICAID / Plan: [employer] [place] MEDICA / Product Type: *No Product type* /   Insurance ID: [id number] - (Medicaid Managed)  Secondary Insurance (if applicable):    Treatment Diagnosis:     ICD-10-CM    1. Chronic bilateral low back pain, unspecified whether sciatica present  M54.50     G89.29          Medical Diagnosis:  Chronic bilateral low back pain without sciatica [M54.50, G89.29]   Referring Physician: Clint Caldwell MD  PCP: Clint Caldwell MD     Plan of care signed (Y/N):     Date of Patient follow up with Physician: N     Progress Report/POC: NO  POC update due: (10 visits /OR AUTH LIMITS, whichever is less)  7/3/2024                                             Precautions/ Contra-indications:           Latex allergy:  NO  Pacemaker:    NO  Contraindications for Manipulation: unhealthy/ multiple comorbidities   Date of Surgery: n/a  Other:    Red Flags:  None    C-SSRS Triggered by Intake questionnaire:   Patient answered 'NO' to both behavioral questions on intake.  No further screening warranted    Preferred Language for Healthcare:   [] English       [x] other: Belgian  Interpretation provided by: Argelia  ID number: [id number]  For verbal instructions and entire follow up session  Waiver signed by family member/caregiver: no      SUBJECTIVE EXAMINATION     Patient stated complaint: Pt reports he feels ok. He feels about the same but is no worse.        Test used Initial score  2024   Pain Summary VAS 6-7 2-3/10   Functional questionnaire Modified [employer] [place]    Other:

## 2024-06-19 ENCOUNTER — HOSPITAL ENCOUNTER (OUTPATIENT)
Dept: PHYSICAL THERAPY | Age: 84
Setting detail: THERAPIES SERIES
Discharge: HOME | End: 2024-06-19
Payer: MEDICAID

## 2024-06-19 PROCEDURE — 97012 MECHANICAL TRACTION THERAPY: CPT

## 2024-06-19 PROCEDURE — 97110 THERAPEUTIC EXERCISES: CPT

## 2024-06-19 NOTE — FLOWSHEET NOTE
[facility]- Outpatient Rehabilitation and Therapy [address], [place] [address] office: [phone] fax: [phone]         Physical Therapy: TREATMENT/PROGRESS NOTE   Patient: Brien Lombardo (84 y.o. male)   Examination Date: 2024   :  1940 MRN: [id number]   Visit #: 6   Insurance Allowable Auth Needed   Med nec []Yes    [x]No    Insurance: Payor: [employer] [place] MEDICAID / Plan: [employer] [place] / Product Type: *No Product type* /   Insurance ID: [id number] - (Medicaid Managed)  Secondary Insurance (if applicable):    Treatment Diagnosis:     ICD-10-CM    1. Chronic bilateral low back pain, unspecified whether sciatica present  M54.50     G89.29          Medical Diagnosis:  Chronic bilateral low back pain without sciatica [M54.50, G89.29]   Referring Physician: Clint Caldwell MD  PCP: Clint Caldwell MD     Plan of care signed (Y/N):     Date of Patient follow up with Physician: N     Progress Report/POC: NO  POC update due: (10 visits /OR AUTH LIMITS, whichever is less)  7/3/2024                                             Precautions/ Contra-indications:           Latex allergy:  NO  Pacemaker:    NO  Contraindications for Manipulation: unhealthy/ multiple comorbidities   Date of Surgery: n/a  Other:    Red Flags:  None    C-SSRS Triggered by Intake questionnaire:   Patient answered 'NO' to both behavioral questions on intake.  No further screening warranted    Preferred Language for Healthcare:   [] English       [x] other: Tanzanian  Interpretation provided by: Argelia  ID number: [id number]  For verbal instructions and entire follow up session  Waiver signed by family member/caregiver: no      SUBJECTIVE EXAMINATION     Patient stated complaint: Pt reports he feels ok. He feels about the same but is no worse.        Test used Initial score  2024   Pain Summary VAS 6-7 2-3/10   Functional questionnaire Modified Osw[employer] [place]    Other:

## 2024-06-24 ENCOUNTER — HOSPITAL ENCOUNTER (OUTPATIENT)
Dept: PHYSICAL THERAPY | Age: 84
Setting detail: THERAPIES SERIES
Discharge: HOME | End: 2024-06-24
Payer: MEDICAID

## 2024-06-24 PROCEDURE — 97110 THERAPEUTIC EXERCISES: CPT

## 2024-06-24 PROCEDURE — 97012 MECHANICAL TRACTION THERAPY: CPT

## 2024-06-24 NOTE — FLOWSHEET NOTE
[facility]- Outpatient Rehabilitation and Therapy [address], [place] [address] office: [phone] fax: [phone]         Physical Therapy: TREATMENT/PROGRESS NOTE   Patient: Brien Lombardo (84 y.o. male)   Examination Date: 2024   :  1940 MRN: [id number]   Visit #: 7   Insurance Allowable Auth Needed   Med nec []Yes    [x]No    Insurance: Payor: [employer] [place]ICA[place] / Plan: [employer] [place] MEDICA / Product Type: *No Product type* /   Insurance ID: [id number] - (Medicaid Managed)  Secondary Insurance (if applicable):    Treatment Diagnosis:     ICD-10-CM    1. Chronic bilateral low back pain, unspecified whether sciatica present  M54.50     G89.29          Medical Diagnosis:  Chronic bilateral low back pain without sciatica [M54.50, G89.29]   Referring Physician: Clint Caldewll MD  PCP: Clint Caldwell MD     Plan of care signed (Y/N):     Date of Patient follow up with Physician: N     Progress Report/POC: NO  POC update due: (10 visits /OR AUTH LIMITS, whichever is less)  7/3/2024                                             Precautions/ Contra-indications:           Latex allergy:  NO  Pacemaker:    NO  Contraindications for Manipulation: unhealthy/ multiple comorbidities   Date of Surgery: n/a  Other:    Red Flags:  None    C-SSRS Triggered by Intake questionnaire:   Patient answered 'NO' to both behavioral questions on intake.  No further screening warranted    Preferred Language for Healthcare:   [] English       [x] other: Argentine  Interpretation provided by: Argelia  ID number: [id number]  For verbal instructions and entire follow up session  Waiver signed by family member/caregiver: no      SUBJECTIVE EXAMINATION     Patient stated complaint: Pt reports his back felt a little better this morning but is bothering him a little more now.        Test used Initial score  2024   Pain Summary VAS 6-7 5/10   Functional questionnaire Modified

## 2024-06-26 ENCOUNTER — HOSPITAL ENCOUNTER (OUTPATIENT)
Dept: PHYSICAL THERAPY | Age: 84
Setting detail: THERAPIES SERIES
Discharge: HOME | End: 2024-06-26
Payer: MEDICAID

## 2024-06-26 PROCEDURE — 97110 THERAPEUTIC EXERCISES: CPT

## 2024-06-26 PROCEDURE — 97012 MECHANICAL TRACTION THERAPY: CPT

## 2024-06-26 NOTE — FLOWSHEET NOTE
[facility]- Outpatient Rehabilitation and Therapy [address], [place] [address] office: [phone] fax: [phone]         Physical Therapy: TREATMENT/PROGRESS NOTE   Patient: Brien Lombardo (84 y.o. male)   Examination Date: 2024   :  1940 MRN: [id number]   Visit #: 8   Insurance Allowable Auth Needed   Med nec []Yes    [x]No    Insurance: Payor: [employer] [place]ICAID / Plan: [employer] [place] / Product Type: *No Product type* /   Insurance ID: [id number] - (Medicaid Managed)  Secondary Insurance (if applicable):    Treatment Diagnosis:     ICD-10-CM    1. Chronic bilateral low back pain, unspecified whether sciatica present  M54.50     G89.29          Medical Diagnosis:  Chronic bilateral low back pain without sciatica [M54.50, G89.29]   Referring Physician: Clint Caldwell MD  PCP: Clint Caldwell MD     Plan of care signed (Y/N):     Date of Patient follow up with Physician: N     Progress Report/POC: NO  POC update due: (10 visits /OR AUTH LIMITS, whichever is less)  7/3/2024                                             Precautions/ Contra-indications:           Latex allergy:  NO  Pacemaker:    NO  Contraindications for Manipulation: unhealthy/ multiple comorbidities   Date of Surgery: n/a  Other:    Red Flags:  None    C-SSRS Triggered by Intake questionnaire:   Patient answered 'NO' to both behavioral questions on intake.  No further screening warranted    Preferred Language for Healthcare:   [] English       [x] other: Kuwaiti  Interpretation provided by: Argelia  ID number: [id number]  For verbal instructions and entire follow up session  Waiver signed by family member/caregiver: no      SUBJECTIVE EXAMINATION     Patient stated complaint: Pt reports he does feel a little better.        Test used Initial score  2024   Pain Summary VAS 6-7 4-5/10   Functional questionnaire Modified [employer] [place]    Other:                  OBJECTIVE

## 2024-07-01 ENCOUNTER — HOSPITAL ENCOUNTER (OUTPATIENT)
Dept: PHYSICAL THERAPY | Age: 84
Setting detail: THERAPIES SERIES
Discharge: HOME | End: 2024-07-01
Payer: MEDICAID

## 2024-07-01 PROCEDURE — 97110 THERAPEUTIC EXERCISES: CPT

## 2024-07-01 PROCEDURE — 97012 MECHANICAL TRACTION THERAPY: CPT

## 2024-07-01 NOTE — FLOWSHEET NOTE
[facility]- Outpatient Rehabilitation and Therapy [address], [place] [address] office: [phone] fax: [phone]         Physical Therapy: TREATMENT/PROGRESS NOTE   Patient: Brien Lombardo (84 y.o. male)   Examination Date: 2024   :  1940 MRN: [id number]   Visit #: 9   Insurance Allowable Auth Needed   Med nec []Yes    [x]No    Insurance: Payor: [employer] [place] / Plan: [employer] [place] / Product Type: *No Product type* /   Insurance ID: [id number] - (Medicaid Managed)  Secondary Insurance (if applicable):    Treatment Diagnosis:     ICD-10-CM    1. Chronic bilateral low back pain, unspecified whether sciatica present  M54.50     G89.29          Medical Diagnosis:  Chronic bilateral low back pain without sciatica [M54.50, G89.29]   Referring Physician: Clint Caldwell MD  PCP: Clint Caldwell MD     Plan of care signed (Y/N):     Date of Patient follow up with Physician: N     Progress Report/POC: NO  POC update due: (10 visits /OR AUTH LIMITS, whichever is less)  7/3/2024 NEXT VISIT                                            Precautions/ Contra-indications:           Latex allergy:  NO  Pacemaker:    NO  Contraindications for Manipulation: unhealthy/ multiple comorbidities   Date of Surgery: n/a  Other:    Red Flags:  None    C-SSRS Triggered by Intake questionnaire:   Patient answered 'NO' to both behavioral questions on intake.  No further screening warranted    Preferred Language for Healthcare:   [] English       [x] other: Hong Konger  Interpretation provided by: Felecia Day  ID number: [id number]   For verbal instructions and entire follow up session  Waiver signed by family member/caregiver: no      SUBJECTIVE EXAMINATION     Patient stated complaint: Pt reports he feels pretty good today. Pt notes he is trying a different type of magnesium at night for muscle cramping. He thinks it has helped a bit.      Test used Initial score  24

## 2024-07-03 ENCOUNTER — HOSPITAL ENCOUNTER (OUTPATIENT)
Dept: PHYSICAL THERAPY | Age: 84
Setting detail: THERAPIES SERIES
Discharge: HOME | End: 2024-07-03
Payer: MEDICAID

## 2024-07-03 PROCEDURE — 97012 MECHANICAL TRACTION THERAPY: CPT

## 2024-07-03 PROCEDURE — 97110 THERAPEUTIC EXERCISES: CPT

## 2024-07-03 NOTE — FLOWSHEET NOTE
[facility]- Outpatient Rehabilitation and Therapy [address], [place] [address] office: [phone] fax: [phone]         Physical Therapy: TREATMENT/PROGRESS NOTE   Patient: Brien Lombardo (84 y.o. male)   Examination Date: 2024   :  1940 MRN: [id number]   Visit #: 10   Insurance Allowable Auth Needed   Med nec []Yes    [x]No    Insurance: Payor: [employer] [place] / Plan: [employer] [place] / Product Type: *No Product type* /   Insurance ID: [id number] - (Medicaid Managed)  Secondary Insurance (if applicable):    Treatment Diagnosis:     ICD-10-CM    1. Chronic bilateral low back pain, unspecified whether sciatica present  M54.50     G89.29          Medical Diagnosis:  Chronic bilateral low back pain without sciatica [M54.50, G89.29]   Referring Physician: Clint Caldwell MD  PCP: Clint Caldwell MD     Plan of care signed (Y/N):     Date of Patient follow up with Physician: N     Progress Report/POC: NO  POC update due: (10 visits /OR AUTH LIMITS, whichever is less)  7/3/2024 NEXT VISIT                                            Precautions/ Contra-indications:           Latex allergy:  NO  Pacemaker:    NO  Contraindications for Manipulation: unhealthy/ multiple comorbidities   Date of Surgery: n/a  Other:    Red Flags:  None    C-SSRS Triggered by Intake questionnaire:   Patient answered 'NO' to both behavioral questions on intake.  No further screening warranted    Preferred Language for Healthcare:   [] English       [x] other: Senegalese  Interpretation provided by: Felecia Day  ID number: [id number]   For verbal instructions and entire follow up session  Waiver signed by family member/caregiver: no      SUBJECTIVE EXAMINATION     Patient stated complaint: Pt reports he is feeling better first thing in the morning when he gets up from bed. However once he starts to walk he does feel continued pain. Pt feels overall improvement though.      Test

## 2024-07-15 ENCOUNTER — APPOINTMENT (OUTPATIENT)
Dept: PHYSICAL THERAPY | Age: 84
End: 2024-07-15
Payer: MEDICAID

## 2024-07-17 ENCOUNTER — HOSPITAL ENCOUNTER (OUTPATIENT)
Dept: PHYSICAL THERAPY | Age: 84
Setting detail: THERAPIES SERIES
Discharge: HOME | End: 2024-07-17
Payer: MEDICAID

## 2024-07-17 PROCEDURE — 97110 THERAPEUTIC EXERCISES: CPT

## 2024-07-17 PROCEDURE — 97112 NEUROMUSCULAR REEDUCATION: CPT

## 2024-07-17 NOTE — PLAN OF CARE
[facility]- Outpatient Rehabilitation and Therapy [address], [place] [address] office: [phone] fax: [phone]       Physical Therapy Re-Certification Plan of Care    Dear Clint Caldwell MD  ,    We had the pleasure of treating the following patient for physical therapy services at [facility]. A summary of our findings can be found in the updated assessment below.  This includes our plan of care.  If you have any questions or concerns regarding these findings, please do not hesitate to contact me at the office phone number checked above.  Thank you for the referral.     Physician Signature:________________________________Date:__________________  By signing above (or electronic signature), therapist's plan is approved by physician      Functional Outcome:   Brien Lombardo 1940 continues to present with functional deficits in ROM, joint mobility, flexibility, and endurance of strength  limiting ability with walking on even ground, managing community ambulation, walking up/down stairs, navigate curbs/steps, transitions between positions, carrying items, lifting items, and ADLs .  During therapy this date, patient required visual cueing, verbal cueing, tactile cueing, and progression of exercises and program for exercise progression, improving proper muscle recruitment and activation/motor control patterns, modulating pain, increasing ROM, and static and dynamic balance. Patient will continue to benefit from ongoing evaluation and advanced clinical decision from a Physical Therapist to improve pain control, ROM, muscle strength, endurance, normalization of gait, and balance and proprioception to safely return to ADLs/IADLs without symptoms or restrictions.    Overall Response to Treatment:  Patient is responding well to treatment and improvement is noted with regards to goals    Total Visits: 11     Recommendation:    [x] Continue PT 1x / wk for

## 2024-07-24 ENCOUNTER — HOSPITAL ENCOUNTER (OUTPATIENT)
Dept: PHYSICAL THERAPY | Age: 84
Setting detail: THERAPIES SERIES
Discharge: HOME | End: 2024-07-24
Payer: MEDICAID

## 2024-07-24 PROCEDURE — 97012 MECHANICAL TRACTION THERAPY: CPT

## 2024-07-24 PROCEDURE — 97110 THERAPEUTIC EXERCISES: CPT

## 2024-07-24 PROCEDURE — 97112 NEUROMUSCULAR REEDUCATION: CPT

## 2024-07-24 NOTE — FLOWSHEET NOTE
advanced clinical decision from a Physical Therapist to address and improve pain control, ROM, muscle strength, normalization of gait, and balance and proprioception to safely return to ADLs/IADLs without symptoms or restrictions.  Pt continues to exhibit significant postural deviation that will limit progress however pt has reported subj improvement. He will continue to benefit from ? strength and stability throughout glute and lumbar musculature.    Medical Necessity Documentation:  I certify that this patient meets the below criteria necessary for medical necessity for care and/or justification of therapy services:  The patient has functional impairments and/or activity limitations and would benefit from continued outpatient therapy services to address the deficits outlined in the patients goals    Return to Play: NA    Prognosis for POC: [x] Good [] Fair  [] Poor    Patient requires continued skilled intervention: [x] Yes  [] No      CHARGE CAPTURE     PT CHARGE GRID   CPT Code (TIMED) minutes # CPT Code (UNTIMED) #     Therex (27570)  30 2  EVAL:LOW (23769 - Typically 20 minutes face-to-face)     Neuromusc. Re-ed (72184) 10 1  Re-Eval (06162)     Manual (20546)    Estim Unattended (49576)     Ther. Act (01923)    Mech. Traction (51830) 1    Gait (02222)    Dry Needle 1-2 muscle (98165)     Aquatic Therex (56608)    Dry Needle 3+ muscle (20561)     Iontophoresis (09635)    VASO (55202)     Ultrasound (64146)    Group Therapy (62856)     Estim Attended (50642)    Canalith Repositioning (05104)     Other:    Other:    Total Timed Code Tx Minutes 40 3       Total Treatment Minutes 52        Charge Justification:  (84153) THERAPEUTIC EXERCISE - Provided verbal/tactile cueing for activities related to strengthening, flexibility, endurance, ROM performed to prevent loss of range of motion, maintain or improve muscular strength or increase flexibility, following either an injury or surgery.   (88147) HOME EXERCISE

## 2024-08-07 ENCOUNTER — HOSPITAL ENCOUNTER (OUTPATIENT)
Dept: PHYSICAL THERAPY | Age: 84
Setting detail: THERAPIES SERIES
Discharge: HOME | End: 2024-08-07
Payer: MEDICAID

## 2024-08-07 PROCEDURE — 97140 MANUAL THERAPY 1/> REGIONS: CPT

## 2024-08-07 PROCEDURE — 97110 THERAPEUTIC EXERCISES: CPT

## 2024-08-07 PROCEDURE — 97012 MECHANICAL TRACTION THERAPY: CPT

## 2024-08-07 NOTE — FLOWSHEET NOTE
balance.Patient will continue to benefit from ongoing evaluation and advanced clinical decision from a Physical Therapist to address and improve pain control, ROM, muscle strength, normalization of gait, and balance and proprioception to safely return to ADLs/IADLs without symptoms or restrictions.  Pt continues to exhibit improvement reporting he feels better overall. Pt continues to exhibit a significant postural deviation that will limit progress however therapy is providing relief and improved function.     Medical Necessity Documentation:  I certify that this patient meets the below criteria necessary for medical necessity for care and/or justification of therapy services:  The patient has functional impairments and/or activity limitations and would benefit from continued outpatient therapy services to address the deficits outlined in the patients goals    Return to Play: NA    Prognosis for POC: [x] Good [] Fair  [] Poor    Patient requires continued skilled intervention: [x] Yes  [] No      CHARGE CAPTURE     PT CHARGE GRID   CPT Code (TIMED) minutes # CPT Code (UNTIMED) #     Therex (43507)  25 2  EVAL:LOW (32289 - Typically 20 minutes face-to-face)     Neuromusc. Re-ed (90182) 10 1  Re-Eval (15125)     Manual (83642)    Estim Unattended (24896)     Ther. Act (16326)    [facility] T[facility] (11961) 1    Gait (26310)    Dry Needle 1-2 muscle (68002)     Aquatic Therex (26746)    Dry Needle 3+ muscle (20561)     Iontophoresis (97934)    VASO (73997)     Ultrasound (19601)    Group Therapy (20808)     Estim Attended (49624)    Canalith Repositioning (63782)     Other:    Other:    Total Timed Code Tx Minutes 35 2       Total Treatment Minutes 50        Charge Justification:  (59467) THERAPEUTIC EXERCISE - Provided verbal/tactile cueing for activities related to strengthening, flexibility, endurance, ROM performed to prevent loss of range of motion, maintain or improve muscular strength or increase flexibility,

## 2024-08-14 ENCOUNTER — HOSPITAL ENCOUNTER (OUTPATIENT)
Dept: PHYSICAL THERAPY | Age: 84
Setting detail: THERAPIES SERIES
Discharge: HOME | End: 2024-08-14
Payer: MEDICAID

## 2024-08-14 PROCEDURE — 97012 MECHANICAL TRACTION THERAPY: CPT

## 2024-08-14 PROCEDURE — 97112 NEUROMUSCULAR REEDUCATION: CPT

## 2024-08-14 PROCEDURE — 97110 THERAPEUTIC EXERCISES: CPT

## 2024-08-14 NOTE — DISCHARGE SUMMARY
Patient will return to modified ADL without increased symptoms or restriction.   [x] Progressing: [x] Met: [] Not Met: [] Adjusted  5. Pt will exhibit improved balance of SLS >15 sec bilat. (patient specific functional goal)    [] Progressing: [x] Met: [] Not Met: [] Adjusted     Overall Progression Towards Functional goals/ Treatment Progress Update:  [x] Patient is progressing as expected towards functional goals listed.    [] Progression is slowed due to complexities/Impairments listed.  [] Progression has been slowed due to co-morbidities.  [] Plan just implemented, too soon (<30days) to assess goals progression   [] Goals require adjustment due to lack of progress  [] Patient is not progressing as expected and requires additional follow up with physician  [] Other:     TREATMENT PLAN       Plan: Discharge    Electronically Signed by Deborah Sky PT  Date: 08/14/2024     Note: Portions of this note have been templated and/or copied from initial evaluation, reassessments and prior notes for documentation efficiency.    Note: If patient does not return for scheduled/recommended follow up visits, this note will serve as a discharge from care along with the most recent update on progress.

## 2024-10-04 ENCOUNTER — TELEPHONE (OUTPATIENT)
Dept: INTERNAL MEDICINE CLINIC | Age: 84
End: 2024-10-04

## 2024-10-04 NOTE — TELEPHONE ENCOUNTER
Called pt to let them know Physical appt is to soon and needs to be after 2-27-25 appt or else pt will have to pay out of pocket

## 2024-10-31 DIAGNOSIS — I10 ESSENTIAL HYPERTENSION: ICD-10-CM

## 2024-10-31 RX ORDER — VALSARTAN 160 MG/1
160 TABLET ORAL DAILY
Qty: 90 TABLET | Refills: 1 | Status: SHIPPED | OUTPATIENT
Start: 2024-10-31

## 2024-11-04 ENCOUNTER — TELEPHONE (OUTPATIENT)
Dept: VASCULAR SURGERY | Age: 84
End: 2024-11-04

## 2024-11-04 ENCOUNTER — OFFICE VISIT (OUTPATIENT)
Dept: INTERNAL MEDICINE CLINIC | Age: 84
End: 2024-11-04
Payer: MEDICAID

## 2024-11-04 VITALS
DIASTOLIC BLOOD PRESSURE: 70 MMHG | HEIGHT: 67 IN | WEIGHT: 181 LBS | BODY MASS INDEX: 28.41 KG/M2 | SYSTOLIC BLOOD PRESSURE: 138 MMHG

## 2024-11-04 DIAGNOSIS — I83.813 VARICOSE VEINS OF BILATERAL LOWER EXTREMITIES WITH PAIN: ICD-10-CM

## 2024-11-04 DIAGNOSIS — Z00.00 ENCOUNTER FOR WELL ADULT EXAM WITHOUT ABNORMAL FINDINGS: Primary | ICD-10-CM

## 2024-11-04 DIAGNOSIS — R10.811 RIGHT UPPER QUADRANT ABDOMINAL TENDERNESS WITHOUT REBOUND TENDERNESS: ICD-10-CM

## 2024-11-04 DIAGNOSIS — G89.29 CHRONIC BILATERAL LOW BACK PAIN WITH RIGHT-SIDED SCIATICA: ICD-10-CM

## 2024-11-04 DIAGNOSIS — E78.5 DYSLIPIDEMIA: ICD-10-CM

## 2024-11-04 DIAGNOSIS — M94.0 ACUTE COSTOCHONDRITIS: ICD-10-CM

## 2024-11-04 DIAGNOSIS — M54.41 CHRONIC BILATERAL LOW BACK PAIN WITH RIGHT-SIDED SCIATICA: ICD-10-CM

## 2024-11-04 DIAGNOSIS — G20.A1 PARKINSON'S DISEASE WITHOUT DYSKINESIA OR FLUCTUATING MANIFESTATIONS (HCC): ICD-10-CM

## 2024-11-04 DIAGNOSIS — I10 ESSENTIAL HYPERTENSION: ICD-10-CM

## 2024-11-04 PROCEDURE — 99397 PER PM REEVAL EST PAT 65+ YR: CPT | Performed by: HOSPITALIST

## 2024-11-04 PROCEDURE — 3078F DIAST BP <80 MM HG: CPT | Performed by: HOSPITALIST

## 2024-11-04 PROCEDURE — G8484 FLU IMMUNIZE NO ADMIN: HCPCS | Performed by: HOSPITALIST

## 2024-11-04 PROCEDURE — 3075F SYST BP GE 130 - 139MM HG: CPT | Performed by: HOSPITALIST

## 2024-11-04 RX ORDER — ROSUVASTATIN CALCIUM 10 MG/1
10 TABLET, COATED ORAL DAILY
Qty: 90 TABLET | Refills: 3 | Status: SHIPPED | OUTPATIENT
Start: 2024-11-04

## 2024-11-04 RX ORDER — AMLODIPINE BESYLATE 10 MG/1
10 TABLET ORAL DAILY
Qty: 90 TABLET | Refills: 3 | Status: SHIPPED | OUTPATIENT
Start: 2024-11-04

## 2024-11-04 RX ORDER — LIDOCAINE 50 MG/G
1 PATCH TOPICAL DAILY
Qty: 30 PATCH | Refills: 1 | Status: SHIPPED | OUTPATIENT
Start: 2024-11-04 | End: 2025-01-03

## 2024-11-04 RX ORDER — EZETIMIBE 10 MG/1
10 TABLET ORAL DAILY
Qty: 90 TABLET | Refills: 3 | Status: SHIPPED | OUTPATIENT
Start: 2024-11-04

## 2024-11-04 RX ORDER — VALSARTAN 160 MG/1
160 TABLET ORAL DAILY
Qty: 90 TABLET | Refills: 3 | Status: SHIPPED | OUTPATIENT
Start: 2024-11-04

## 2024-11-04 SDOH — ECONOMIC STABILITY: FOOD INSECURITY: WITHIN THE PAST 12 MONTHS, YOU WORRIED THAT YOUR FOOD WOULD RUN OUT BEFORE YOU GOT MONEY TO BUY MORE.: NEVER TRUE

## 2024-11-04 SDOH — ECONOMIC STABILITY: FOOD INSECURITY: WITHIN THE PAST 12 MONTHS, THE FOOD YOU BOUGHT JUST DIDN'T LAST AND YOU DIDN'T HAVE MONEY TO GET MORE.: NEVER TRUE

## 2024-11-04 SDOH — ECONOMIC STABILITY: INCOME INSECURITY: HOW HARD IS IT FOR YOU TO PAY FOR THE VERY BASICS LIKE FOOD, HOUSING, MEDICAL CARE, AND HEATING?: NOT HARD AT ALL

## 2024-11-04 NOTE — PROGRESS NOTES
Well Adult Note  Name: Brien Lombardo Today’s Date: 2024   MRN: [id number] Sex: Male   Age: 84 y.o. Ethnicity: Non- / Non    : 1940 Race: White (non-)      Brien Lombardo is here for a well adult exam.       Subjective   History:  Reports episodes of substernal chest discomfort. Pain is not associated with exertion. It is worse during windy and cold days.  + intermittent RUQ abdominal area pain. + previous history of cholelithiasis, status postcholecystectomy   + frequent urination, especially in the morning  Parkinson's disease appears to be very well-controlled with current use of carbidopa-levodopa  mg orally 3 times a day and extended release Sinemet overnight  Blood pressure is well-controlled.  Adheres to low-fat/low-cholesterol diet.  Walks for about 45-60 minutes daily with use of Scandinavian sticks.  No recent falls.  Does not feel depressed    Review of Systems  As above; all other systems were reviewed and were negative  .  No hearing loss.  Has scheduled dental and eye examinations    No Known Allergies  Prior to Visit Medications    Medication Sig Taking? Authorizing Provider   amLODIPine (NORVASC) 10 MG tablet Take 1 tablet by mouth daily Yes Clint Caldwell MD   valsartan (DIOVAN) 160 MG tablet Take 1 tablet by mouth daily Yes Clint Caldwell MD   rosuvastatin (CRESTOR) 10 MG tablet Take 1 tablet by mouth daily Yes Clint Caldwell MD   ezetimibe (ZETIA) 10 MG tablet Take 1 tablet by mouth daily Yes Clint Caldwell MD   diclofenac sodium (VOLTAREN) 1 % GEL Apply 2 g topically 4 times daily Yes Clint Caldwell MD   lidocaine (LIDODERM) 5 % Place 1 patch onto the skin daily 12 hours on, 12 hours off. Yes Clint Caldwell MD   carbidopa-levodopa (SINEMET)  MG per tablet Take 1 tablet by mouth Yes Olivia Shaikh MD   esomeprazole Magnesium (NEXIUM) 20 MG PACK Take 1 packet by mouth daily Yes Olivia Shaikh MD   aspirin 81 MG EC tablet Take 1 tablet by

## 2024-11-04 NOTE — PATIENT INSTRUCTIONS
Well Visit, Over 65: Care Instructions  Well visits can help you stay healthy. Your doctor has checked your overall health and may have suggested ways to take good care of yourself. Your doctor also may have recommended tests. You can help prevent illness with healthy eating, good sleep, vaccinations, regular exercise, and other steps.    Get the tests that you and your doctor decide on. Depending on your age and risks, examples might include hearing tests as well as screening for colon, breast, and lung cancer. Screening helps find diseases before any symptoms appear.   Eat healthy foods. Choose fruits, vegetables, whole grains, lean protein, and low-fat dairy foods. Limit saturated fat, and reduce salt.     Limit alcohol. Men should have no more than 2 drinks a day. Women should have no more than 1. For some people, no alcohol is the best choice.   Exercise. It can help prevent falls. Get at least 30 minutes of exercise on most days of the week. Walking, yoga, and yanick chi can be good choices.     Reach and stay at your healthy weight. This will lower your risk for many health problems.   Take care of your mental health. Try to stay connected with friends, family, and community, and find ways to manage stress.     If you're feeling depressed or hopeless, talk to someone. A counselor can help. If you don't have a counselor, talk to your doctor.   Talk to your doctor if you think you may have a problem with alcohol or drug use. This includes prescription medicines and illegal drugs.     Avoid tobacco and nicotine: Don't smoke, vape, or chew. If you need help quitting, talk to your doctor.   Practice safer sex. Getting tested, using condoms or dental dams, and limiting sex partners can help prevent STIs.     Make an advance directive. This is a legal way to tell your family and doctor what you want to happen at the end of your life or when you can't speak for yourself.   Prevent problems where you can. Protect

## 2024-11-04 NOTE — TELEPHONE ENCOUNTER
Patient was referred in by Dr. Clint Caldwell for  I83.813 (ICD-10-CM) - Varicose veins of bilateral lower extremities with pain.     Patient would like to schedule a new patient appt.     Patients daughter in law is the best callback to schedule. Phone number is [phone].

## 2024-11-20 ENCOUNTER — HOSPITAL ENCOUNTER (OUTPATIENT)
Dept: PHYSICAL THERAPY | Age: 84
Setting detail: THERAPIES SERIES
Discharge: HOME | End: 2024-11-20
Payer: MEDICAID

## 2024-11-20 PROCEDURE — 97112 NEUROMUSCULAR REEDUCATION: CPT

## 2024-11-20 PROCEDURE — 97110 THERAPEUTIC EXERCISES: CPT

## 2024-11-20 PROCEDURE — 97012 MECHANICAL TRACTION THERAPY: CPT

## 2024-11-20 PROCEDURE — 97164 PT RE-EVAL EST PLAN CARE: CPT

## 2024-11-20 NOTE — PLAN OF CARE
towards functional goals listed.    [] Progression is slowed due to complexities/Impairments listed.  [] Progression has been slowed due to co-morbidities.  [x] Plan just implemented, too soon (<30days) to assess goals progression   [] Goals require adjustment due to lack of progress  [] Patient is not progressing as expected and requires additional follow up with physician  [] Other:     TREATMENT PLAN       Plan: Cont POC- Continue emphasis/focus on exercise progression, modulating pain, and static and dynamic balance. Next visit plan to progress reps, add new exercises, and progress balance     Electronically Signed by Deborah Sky, PT  Date: 11/20/2024     Note: Portions of this note have been templated and/or copied from initial evaluation, reassessments and prior notes for documentation efficiency.    Note: If patient does not return for scheduled/recommended follow up visits, this note will serve as a discharge from care along with the most recent update on progress.

## 2024-12-04 ENCOUNTER — HOSPITAL ENCOUNTER (OUTPATIENT)
Dept: PHYSICAL THERAPY | Age: 84
Setting detail: THERAPIES SERIES
Discharge: HOME | End: 2024-12-04
Payer: MEDICAID

## 2024-12-04 PROCEDURE — 97012 MECHANICAL TRACTION THERAPY: CPT

## 2024-12-04 PROCEDURE — 97110 THERAPEUTIC EXERCISES: CPT

## 2024-12-04 NOTE — FLOWSHEET NOTE
[facility]- Outpatient Rehabilitation and Therapy [address], [place] [address] office: [phone] fax: [phone]         Physical Therapy: TREATMENT/PROGRESS NOTE   Patient: Brien Lombardo (84 y.o. male)   Examination Date: 2024   :  1940 MRN: [id number]   Visit #: 16   Insurance Allowable Auth Needed   Med nec []Yes    [x]No    Insurance: Payor: [employer] [place] MEDICAID / Plan: [employer] [facility] [place] MEDICA / Product Type: *No Product type* /   Insurance ID: [id number] - (Medicaid Managed)  Secondary Insurance (if applicable):    Treatment Diagnosis:     ICD-10-CM    1. Chronic bilateral low back pain, unspecified whether sciatica present  M54.50     G89.29          Medical Diagnosis:  Chronic bilateral low back pain without sciatica [M54.50, G89.29]   Referring Physician: Clint Caldwell MD  PCP: Clint Caldwell MD     Plan of care signed (Y/N):     Date of Patient follow up with Physician: N     Progress Report/POC: NO  POC update due: (10 visits /OR AUTH LIMITS, whichever is less)                                              Precautions/ Contra-indications:           Latex allergy:  NO  Pacemaker:    NO  Contraindications for Manipulation: unhealthy/ multiple comorbidities   Date of Surgery: n/a  Other: additional dx: PD (taking medication)    Red Flags:  None    C-SSRS Triggered by Intake questionnaire:   Patient answered 'NO' to both behavioral questions on intake.  No further screening warranted    Preferred Language for Healthcare:   [] English       [x] other: Estonian  Interpretation provided by: Felecia  ID number: [id number]  For verbal instructions and entire follow up session  Waiver signed by family member/caregiver: no      SUBJECTIVE EXAMINATION     Patient stated complaint: Pt reports he felt really good after the last visit for a couple days but now feels like he is hunched forward again.      Test used Initial score  2024   Pain  I left a VM asking Nely to fax us over her paperwork she needs. She states she gave [facility] nurse her papers on 10/31-Deneen does not have anything. She recently saw Dennise Walker has the paperwork in her email and will handle it from here.

## 2024-12-10 DIAGNOSIS — I10 ESSENTIAL HYPERTENSION: ICD-10-CM

## 2024-12-10 LAB
ALBUMIN SERPL-MCNC: 4.5 G/DL (ref 3.4–5)
ALBUMIN/GLOB SERPL: 1.7 {RATIO} (ref 1.1–2.2)
ALP SERPL-CCNC: 84 U/L (ref 40–129)
ALT SERPL-CCNC: 31 U/L (ref 10–40)
ANION GAP SERPL CALCULATED.3IONS-SCNC: 10 MMOL/L (ref 3–16)
AST SERPL-CCNC: 24 U/L (ref 15–37)
BASOPHILS # BLD: 0 K/UL (ref 0–0.2)
BASOPHILS NFR BLD: 0.6 %
BILIRUB SERPL-MCNC: 0.4 MG/DL (ref 0–1)
BUN SERPL-MCNC: 21 MG/DL (ref 7–20)
CALCIUM SERPL-MCNC: 10.1 MG/DL (ref 8.3–10.6)
CHLORIDE SERPL-SCNC: 103 MMOL/L (ref 99–110)
CHOLEST SERPL-MCNC: 160 MG/DL (ref 0–199)
CO2 SERPL-SCNC: 28 MMOL/L (ref 21–32)
CREAT SERPL-MCNC: 1.1 MG/DL (ref 0.8–1.3)
DEPRECATED RDW RBC AUTO: 13.3 % (ref 12.4–15.4)
EOSINOPHIL # BLD: 0.3 K/UL (ref 0–0.6)
EOSINOPHIL NFR BLD: 3.6 %
GFR SERPLBLD CREATININE-BSD FMLA CKD-EPI: 66 ML/MIN/{1.73_M2}
GLUCOSE SERPL-MCNC: 107 MG/DL (ref 70–99)
HCT VFR BLD AUTO: 47.7 % (ref 40.5–52.5)
HDLC SERPL-MCNC: 52 MG/DL (ref 40–60)
HGB BLD-MCNC: 16.2 G/DL (ref 13.5–17.5)
LDLC SERPL CALC-MCNC: 74 MG/DL
LYMPHOCYTES # BLD: 2.2 K/UL (ref 1–5.1)
LYMPHOCYTES NFR BLD: 28.2 %
MCH RBC QN AUTO: 33 PG (ref 26–34)
MCHC RBC AUTO-ENTMCNC: 33.9 G/DL (ref 31–36)
MCV RBC AUTO: 97.4 FL (ref 80–100)
MONOCYTES # BLD: 0.5 K/UL (ref 0–1.3)
MONOCYTES NFR BLD: 6.6 %
NEUTROPHILS # BLD: 4.8 K/UL (ref 1.7–7.7)
NEUTROPHILS NFR BLD: 61 %
PLATELET # BLD AUTO: 293 K/UL (ref 135–450)
PMV BLD AUTO: 8.1 FL (ref 5–10.5)
POTASSIUM SERPL-SCNC: 5.4 MMOL/L (ref 3.5–5.1)
PROT SERPL-MCNC: 7.2 G/DL (ref 6.4–8.2)
RBC # BLD AUTO: 4.9 M/UL (ref 4.2–5.9)
SODIUM SERPL-SCNC: 141 MMOL/L (ref 136–145)
TRIGL SERPL-MCNC: 170 MG/DL (ref 0–150)
TSH SERPL DL<=0.005 MIU/L-ACNC: 2.34 UIU/ML (ref 0.27–4.2)
VLDLC SERPL CALC-MCNC: 34 MG/DL
WBC # BLD AUTO: 7.9 K/UL (ref 4–11)

## 2024-12-11 ENCOUNTER — APPOINTMENT (OUTPATIENT)
Dept: PHYSICAL THERAPY | Age: 84
End: 2024-12-11
Payer: MEDICAID

## 2024-12-18 ENCOUNTER — HOSPITAL ENCOUNTER (OUTPATIENT)
Dept: PHYSICAL THERAPY | Age: 84
Setting detail: THERAPIES SERIES
Discharge: HOME | End: 2024-12-18
Payer: MEDICAID

## 2024-12-18 PROCEDURE — 97110 THERAPEUTIC EXERCISES: CPT

## 2024-12-18 PROCEDURE — 97012 MECHANICAL TRACTION THERAPY: CPT

## 2024-12-18 NOTE — FLOWSHEET NOTE
[facility]- Outpatient Rehabilitation and Therapy [address], [place] [address] office: [phone] fax: [phone]         Physical Therapy: TREATMENT/PROGRESS NOTE   Patient: Brien Lombardo (84 y.o. male)   Examination Date: 2024   :  1940 MRN: [id number]   Visit #: 17   Insurance Allowable Auth Needed   Med nec []Yes    [x]No    Insurance: Payor: [employer] [place] MEDICAID / Plan: [employer] [place] MEDICA / Product Type: *No Product type* /   Insurance ID: [id number] - (Medicaid Managed)  Secondary Insurance (if applicable):    Treatment Diagnosis:     ICD-10-CM    1. Chronic bilateral low back pain, unspecified whether sciatica present  M54.50     G89.29          Medical Diagnosis:  Chronic bilateral low back pain without sciatica [M54.50, G89.29]   Referring Physician: Clint Caldwell MD  PCP: Clint Caldwell MD     Plan of care signed (Y/N):     Date of Patient follow up with Physician: N     Progress Report/POC: NO  POC update due: (10 visits /OR AUTH LIMITS, whichever is less)                                              Precautions/ Contra-indications:           Latex allergy:  NO  Pacemaker:    NO  Contraindications for Manipulation: unhealthy/ multiple comorbidities   Date of Surgery: n/a  Other: additional dx: PD (taking medication)    Red Flags:  None    C-SSRS Triggered by Intake questionnaire:   Patient answered 'NO' to both behavioral questions on intake.  No further screening warranted    Preferred Language for Healthcare:   [] English       [x] other: Stateless  Interpretation provided by: Felecia  ID number: [id number]  For verbal instructions and entire follow up session  Waiver signed by family member/caregiver: no      SUBJECTIVE EXAMINATION     Patient stated complaint: Pt reports he feels ok right now.      Test used Initial score  2024   Pain Summary VAS 6-7 5/10   Functional questionnaire Modified Oswestry [place]    Other:

## 2024-12-23 DIAGNOSIS — I83.93 VARICOSE VEINS OF LEGS: Primary | ICD-10-CM

## 2025-01-08 ENCOUNTER — HOSPITAL ENCOUNTER (OUTPATIENT)
Dept: PHYSICAL THERAPY | Age: 85
Setting detail: THERAPIES SERIES
Discharge: HOME | End: 2025-01-08
Payer: MEDICAID

## 2025-01-08 PROCEDURE — 97110 THERAPEUTIC EXERCISES: CPT

## 2025-01-08 PROCEDURE — 97012 MECHANICAL TRACTION THERAPY: CPT

## 2025-01-08 PROCEDURE — 97112 NEUROMUSCULAR REEDUCATION: CPT

## 2025-01-08 NOTE — FLOWSHEET NOTE
demonstrate increased Strength of hip musculature to at least 5/5 throughout without pain to allow for proper functional mobility to enable patient to return to ascending and descending steps.   [x] Progressing: [] Met: [] Not Met: [] Adjusted  4. Patient will return to modified ADL without increased symptoms or restriction.   [x] Progressing: [] Met: [] Not Met: [] Adjusted  5. Pt will exhibit improved balance of SLS >15 sec bilat. (patient specific functional goal)    [x] Progressing: [] Met: [] Not Met: [] Adjusted     Overall Progression Towards Functional goals/ Treatment Progress Update:  [] Patient is progressing as expected towards functional goals listed.    [x] Progression is slowed due to complexities/Impairments listed.  [] Progression has been slowed due to co-morbidities.  [] Plan just implemented, too soon (<30days) to assess goals progression   [] Goals require adjustment due to lack of progress  [] Patient is not progressing as expected and requires additional follow up with physician  [] Other:     TREATMENT PLAN       Plan: Cont POC- Continue emphasis/focus on exercise progression, modulating pain, and static and dynamic balance. Next visit plan to progress reps, add new exercises, progress balance, and continue traction     Electronically Signed by Deborah Sky PT  Date: 01/08/2025     Note: Portions of this note have been templated and/or copied from initial evaluation, reassessments and prior notes for documentation efficiency.    Note: If patient does not return for scheduled/recommended follow up visits, this note will serve as a discharge from care along with the most recent update on progress.

## 2025-01-13 ENCOUNTER — HOSPITAL ENCOUNTER (OUTPATIENT)
Dept: PHYSICAL THERAPY | Age: 85
Setting detail: THERAPIES SERIES
Discharge: HOME | End: 2025-01-13
Payer: MEDICAID

## 2025-01-13 PROCEDURE — 97110 THERAPEUTIC EXERCISES: CPT

## 2025-01-13 PROCEDURE — 97012 MECHANICAL TRACTION THERAPY: CPT

## 2025-01-13 PROCEDURE — 97140 MANUAL THERAPY 1/> REGIONS: CPT

## 2025-01-13 NOTE — FLOWSHEET NOTE
therapy this date, patient required visual cueing, verbal cueing, and tactile cueing for exercise progression, improving proper muscle recruitment and activation/motor control patterns, modulating pain, promoting relaxation, improving soft tissue extensibility, and static and dynamic balance.Patient will continue to benefit from ongoing evaluation and advanced clinical decision from a Physical Therapist to address and improve pain control, ROM, muscle strength, endurance, and functional mobility to safely return to ADLs/IADLs without symptoms or restrictions.      Medical Necessity Documentation:  I certify that this patient meets the below criteria necessary for medical necessity for care and/or justification of therapy services:  The patient has functional impairments and/or activity limitations and would benefit from continued outpatient therapy services to address the deficits outlined in the patients goals  The patient has associated co-morbidities along with primary diagnosis which significantly impact the rate of recovery and contribute to complexities that require skilled therapeutic intervention  The patient requires ongoing skilled intervention in order to prevent decline of function and worsening of symptoms. Therapy will be administered sparingly.    Return to Play: NA    Prognosis for POC: [x] Good [] Fair  [] Poor    Patient requires continued skilled intervention: [x] Yes  [] No      CHARGE CAPTURE     PT CHARGE GRID   CPT Code (TIMED) minutes # CPT Code (UNTIMED) #     Therex (80694)  30 2  EVAL:LOW (72019 - Typically 20 minutes face-to-face)     Neuromusc. Re-ed (99275) 10 1  Re-Eval (75543)     Manual (08227)    Estim Unattended (57126)     Ther. Act (81670)    [facility]. Traction (88167) 1    Gait (56879)    Dry Needle 1-2 muscle (20560)     Aquatic Therex (51361)    Dry Needle 3+ muscle (20561)     Iontophoresis (65774)    VASO (11852)     Ultrasound (56021)    Group Therapy (62447)     Estim Attended

## 2025-01-15 ENCOUNTER — TELEPHONE (OUTPATIENT)
Dept: INTERNAL MEDICINE CLINIC | Age: 85
End: 2025-01-15

## 2025-01-15 NOTE — TELEPHONE ENCOUNTER
Abdominal Pail  (Newest Message First)  View All Conversations on this Encounter  Brien Grupo GUEVARA [place] [address] Practice Support (supporting Clint Caldwell MD)28 minutes ago (1:21 PM)     SG  Hello Dr. Caldwell,   As Brien indicated during the last appointment, he continues to have intermittent abdominal pain in the epigastrium and right upper quadrant. It is not related to any physical activity, change in body position, certain activities, or eating meals.   His recent lab work was unremarkable, including liver function tests. We are wondering if needs any further imaging, since his symptoms are not going away.   Thank you.

## 2025-01-21 DIAGNOSIS — R10.13 EPIGASTRIC ABDOMINAL PAIN: Primary | ICD-10-CM

## 2025-01-22 ENCOUNTER — HOSPITAL ENCOUNTER (OUTPATIENT)
Dept: PHYSICAL THERAPY | Age: 85
Setting detail: THERAPIES SERIES
Discharge: HOME | End: 2025-01-22
Payer: MEDICAID

## 2025-01-22 PROCEDURE — 97012 MECHANICAL TRACTION THERAPY: CPT

## 2025-01-22 PROCEDURE — 97110 THERAPEUTIC EXERCISES: CPT

## 2025-01-22 PROCEDURE — 97112 NEUROMUSCULAR REEDUCATION: CPT

## 2025-01-22 NOTE — FLOWSHEET NOTE
musculature to at least 5/5 throughout without pain to allow for proper functional mobility to enable patient to return to ascending and descending steps.   [x] Progressing: [] Met: [] Not Met: [] Adjusted  4. Patient will return to modified ADL without increased symptoms or restriction.   [x] Progressing: [] Met: [] Not Met: [] Adjusted  5. Pt will exhibit improved balance of SLS >15 sec bilat. (patient specific functional goal)    [x] Progressing: [] Met: [] Not Met: [] Adjusted     Overall Progression Towards Functional goals/ Treatment Progress Update:  [] Patient is progressing as expected towards functional goals listed.    [x] Progression is slowed due to complexities/Impairments listed.  [] Progression has been slowed due to co-morbidities.  [] Plan just implemented, too soon (<30days) to assess goals progression   [] Goals require adjustment due to lack of progress  [] Patient is not progressing as expected and requires additional follow up with physician  [] Other:     TREATMENT PLAN       Plan: Cont POC- Continue emphasis/focus on exercise progression, modulating pain, and static and dynamic balance. Next visit plan to progress reps, add new exercises, progress balance, and continue traction     Electronically Signed by Deborah Sky, PT  Date: 01/22/2025     Note: Portions of this note have been templated and/or copied from initial evaluation, reassessments and prior notes for documentation efficiency.    Note: If patient does not return for scheduled/recommended follow up visits, this note will serve as a discharge from care along with the most recent update on progress.

## 2025-01-29 ENCOUNTER — APPOINTMENT (OUTPATIENT)
Dept: PHYSICAL THERAPY | Age: 85
End: 2025-01-29
Payer: MEDICAID

## 2025-02-12 ENCOUNTER — HOSPITAL ENCOUNTER (OUTPATIENT)
Dept: PHYSICAL THERAPY | Age: 85
Setting detail: THERAPIES SERIES
Discharge: HOME | End: 2025-02-12
Payer: MEDICAID

## 2025-02-12 PROCEDURE — 97110 THERAPEUTIC EXERCISES: CPT

## 2025-02-12 PROCEDURE — 97112 NEUROMUSCULAR REEDUCATION: CPT

## 2025-02-12 PROCEDURE — 97012 MECHANICAL TRACTION THERAPY: CPT

## 2025-02-12 NOTE — PLAN OF CARE
status to safely return to PLOF and ADLs/IADLs without symptoms or restrictions.      Medical Necessity Documentation:  I certify that this patient meets the below criteria necessary for medical necessity for care and/or justification of therapy services:  The patient has functional impairments and/or activity limitations and would benefit from continued outpatient therapy services to address the deficits outlined in the patients goals  The patient has associated co-morbidities along with primary diagnosis which significantly impact the rate of recovery and contribute to complexities that require skilled therapeutic intervention  The patient requires ongoing skilled intervention in order to prevent decline of function and worsening of symptoms. Therapy will be administered sparingly.    Return to Play: NA    Prognosis for POC: [x] Good [] Fair  [] Poor    Patient requires continued skilled intervention: [x] Yes  [] No      CHARGE CAPTURE     PT CHARGE GRID   CPT Code (TIMED) minutes # CPT Code (UNTIMED) #     Therex (71558)  30 2  EVAL:LOW (61993 - Typically 20 minutes face-to-face)     Neuromusc. Re-ed (04599) 10 1  Re-Eval (71104)     Manual (27345)    Estim Unattended (75717)     Ther. Act (44648)    Mech. Traction (35146) 1    Gait (91468)    Dry Needle 1-2 muscle (13637)     Aquatic Therex (31758)    Dry Needle 3+ muscle (20561)     Iontophoresis (02421)    VASO (04082)     Ultrasound (82523)    Group Therapy (81316)     Estim Attended (47915)    Canalith Repositioning (26429)     Other:    Other:    Total Timed Code Tx Minutes 40 3  10     Total Treatment Minutes 50        Charge Justification:  (20873) THERAPEUTIC EXERCISE - Provided verbal/tactile cueing for activities related to strengthening, flexibility, endurance, ROM performed to prevent loss of range of motion, maintain or improve muscular strength or increase flexibility, following either an injury or surgery.   (33471) HOME EXERCISE PROGRAM -

## 2025-02-24 ENCOUNTER — HOSPITAL ENCOUNTER (OUTPATIENT)
Dept: PHYSICAL THERAPY | Age: 85
Setting detail: THERAPIES SERIES
Discharge: HOME | End: 2025-02-24
Payer: MEDICAID

## 2025-02-24 PROCEDURE — 97112 NEUROMUSCULAR REEDUCATION: CPT

## 2025-02-24 PROCEDURE — 97012 MECHANICAL TRACTION THERAPY: CPT

## 2025-02-24 PROCEDURE — 97110 THERAPEUTIC EXERCISES: CPT

## 2025-02-24 NOTE — FLOWSHEET NOTE
[facility]- Outpatient Rehabilitation and Therapy [address], [place] [address] office: [phone] fax: [phone]         Physical Therapy: TREATMENT/PROGRESS NOTE   Patient: Brien Lombardo (84 y.o. male)   Examination Date: 2025   :  1940 MRN: [id number]   Visit #: 22   Insurance Allowable Auth Needed   Med nec []Yes    [x]No    Insurance: Payor: [employer] [place] MEDICAID / Plan: [employer] [place] MEDICA / Product Type: *No Product type* /   Insurance ID: [id number] - (Medicaid Managed)  Secondary Insurance (if applicable):    Treatment Diagnosis:     ICD-10-CM    1. Chronic bilateral low back pain, unspecified whether sciatica present  M54.50     G89.29          Medical Diagnosis:  Chronic bilateral low back pain without sciatica [M54.50, G89.29]   Referring Physician: Clint Caldwell MD  PCP: Clint Caldwell MD     Plan of care signed (Y/N):     Date of Patient follow up with Physician: N     Progress Report/POC: NO  POC update due: (10 visits /OR AUTH LIMITS, whichever is less)  3/12                                            Precautions/ Contra-indications:           Latex allergy:  NO  Pacemaker:    NO  Contraindications for Manipulation: unhealthy/ multiple comorbidities   Date of Surgery: n/a  Other: additional dx: PD (taking medication)    Red Flags:  None    C-SSRS Triggered by Intake questionnaire:   Patient answered 'NO' to both behavioral questions on intake.  No further screening warranted    Preferred Language for Healthcare:   [] English       [x] other: Wallisian  Interpretation provided by:    ID number: [id number]  For verbal instructions and entire follow up session  Waiver signed by family member/caregiver: no      SUBJECTIVE EXAMINATION     Patient stated complaint: Pt reports his back feels \"ok\" today     Test used Initial score  2025   Pain Summary VAS 6-7 4/10   Functional questionnaire Modified Oswestry [place]    Other:

## 2025-02-25 ENCOUNTER — OFFICE VISIT (OUTPATIENT)
Dept: VASCULAR SURGERY | Age: 85
End: 2025-02-25
Payer: MEDICAID

## 2025-02-25 VITALS
BODY MASS INDEX: 28.56 KG/M2 | WEIGHT: 182 LBS | TEMPERATURE: 97.9 F | HEIGHT: 67 IN | OXYGEN SATURATION: 96 % | SYSTOLIC BLOOD PRESSURE: 151 MMHG | DIASTOLIC BLOOD PRESSURE: 74 MMHG | HEART RATE: 62 BPM

## 2025-02-25 DIAGNOSIS — I83.813 VARICOSE VEINS OF BILATERAL LOWER EXTREMITIES WITH PAIN: Primary | ICD-10-CM

## 2025-02-25 PROCEDURE — G8427 DOCREV CUR MEDS BY ELIG CLIN: HCPCS | Performed by: SURGERY

## 2025-02-25 PROCEDURE — 1123F ACP DISCUSS/DSCN MKR DOCD: CPT | Performed by: SURGERY

## 2025-02-25 PROCEDURE — G8419 CALC BMI OUT NRM PARAM NOF/U: HCPCS | Performed by: SURGERY

## 2025-02-25 PROCEDURE — 1036F TOBACCO NON-USER: CPT | Performed by: SURGERY

## 2025-02-25 PROCEDURE — 99203 OFFICE O/P NEW LOW 30 MIN: CPT | Performed by: SURGERY

## 2025-02-25 NOTE — PROGRESS NOTES
[facility] Vascular Surgery  Beverley Smith MD, FACS, [facility]  [phone]    OUTPATIENT CONSULTATION    Date of Consultation:  2/25/2025    PCP:  Clint Caldwell MD       Chief Complaint: Varicose veins    History of Present Illness:   Brien Lombardo is a 84 y.o. male who presents today for evaluation of varicose veins.  He is accompanied today by his son who is an internist in the [employer] system.  Son also translates today at their request.  He reports pain in anterior lower legs.  Charley horses at night which have improved with increasing compression stockings to 30-40mmHg.  He tolerates these well and is compliant.  Notes heaviness or fatigue at end of day.  Better with elevation.  He also has spinal stenosis.  Overall, he is active and walks \"a lot\" with his wife.      I personally reviewed images of the following study:  Vascular US Reflux Johny Insufficiency Study Bilateral 12/23/2024     Past Medical History:  Past Medical History:   Diagnosis Date    Acne rosacea     Chronic bilateral low back pain without sciatica     Dyslipidemia     Fatty liver     Kidney stones     Parkinson disease (HCC)     Primary hypertension     Prostate cancer (HCC)     PUD (peptic ulcer disease)     Varicose veins of bilateral lower extremities with pain     Vasomotor rhinitis      Past Surgical History:  Past Surgical History:   Procedure Laterality Date    PROSTATECTOMY       Home Medications:   Prior to Admission medications    Medication Sig Start Date End Date Taking? Authorizing Provider   amLODIPine (NORVASC) 10 MG tablet Take 1 tablet by mouth daily 11/4/24  Yes Clint Caldwell MD   valsartan (DIOVAN) 160 MG tablet Take 1 tablet by mouth daily 11/4/24  Yes Clint Caldwell MD   rosuvastatin (CRESTOR) 10 MG tablet Take 1 tablet by mouth daily 11/4/24  Yes Clint Caldwell MD   ezetimibe (ZETIA) 10 MG tablet Take 1 tablet by mouth daily 11/4/24  Yes Clint Caldwell MD   diclofenac sodium (VOLTAREN) 1 % GEL Apply 2 g

## 2025-02-26 ENCOUNTER — APPOINTMENT (OUTPATIENT)
Dept: PHYSICAL THERAPY | Age: 85
End: 2025-02-26
Payer: MEDICAID

## 2025-03-05 ENCOUNTER — HOSPITAL ENCOUNTER (OUTPATIENT)
Dept: PHYSICAL THERAPY | Age: 85
Setting detail: THERAPIES SERIES
Discharge: HOME | End: 2025-03-05
Payer: MEDICAID

## 2025-03-05 PROCEDURE — 97112 NEUROMUSCULAR REEDUCATION: CPT

## 2025-03-05 PROCEDURE — 97012 MECHANICAL TRACTION THERAPY: CPT

## 2025-03-05 PROCEDURE — 97110 THERAPEUTIC EXERCISES: CPT

## 2025-03-05 PROCEDURE — 97140 MANUAL THERAPY 1/> REGIONS: CPT

## 2025-03-05 NOTE — FLOWSHEET NOTE
Ultrasound (76254)    Group Therapy (67253)     Estim Attended (42231)    Canalith Repositioning (27172)     Other:    Other:    Total Timed Code Tx Minutes 40 3  10     Total Treatment Minutes 50        Charge Justification:  (86446) THERAPEUTIC EXERCISE - Provided verbal/tactile cueing for HEP and/or activities related to strengthening, flexibility, endurance, ROM performed to prevent loss of range of motion, maintain or improve muscular strength or increase flexibility, following either an injury or surgery.   (97824) NEUROMUSCULAR RE-EDUCATION - Provided therapeutic procedure on activities related to neuromuscular reeducation of movement, balance, coordination, kinesthetic sense, posture, and/or proprioception for sitting and/or standing activities. Provided HEP review and/or progression.      GOALS     Patient stated goal: balance better, decrease pain  [x] Progressing: [] Met: [] Not Met: [] Adjusted    Therapist goals for Patient:   Short Term Goals: To be achieved in: 2 weeks  1. Independent in HEP and progression per patient tolerance, in order to prevent re-injury.   [] Progressing: [x] Met: [] Not Met: [] Adjusted  2. Patient will have a decrease in pain to 5</10 to facilitate improvement in movement, function, and ADLs as indicated by Functional Deficits.  [] Progressing: [x] Met: [] Not Met: [] Adjusted    Long Term Goals: To be achieved in: 12 weeks  1. Disability index score of 15% or less for the Modified Oswestry to assist with reaching prior level of function with activities such as walking >15 min.  [x] Progressing: [] Met: [] Not Met: [] Adjusted  2. Patient will demonstrate increased AROM of lumbar and hip to WFL without pain to allow for proper joint functioning to enable patient to navigate ADL.   [x] Progressing: [] Met: [] Not Met: [] Adjusted  3. Patient will demonstrate increased Strength of hip musculature to at least 5/5 throughout without pain to allow for proper functional mobility to

## 2025-03-10 ENCOUNTER — HOSPITAL ENCOUNTER (OUTPATIENT)
Dept: PHYSICAL THERAPY | Age: 85
Setting detail: THERAPIES SERIES
Discharge: HOME | End: 2025-03-10
Payer: MEDICAID

## 2025-03-10 PROCEDURE — 97112 NEUROMUSCULAR REEDUCATION: CPT

## 2025-03-10 PROCEDURE — 97012 MECHANICAL TRACTION THERAPY: CPT

## 2025-03-10 PROCEDURE — 97110 THERAPEUTIC EXERCISES: CPT

## 2025-03-10 NOTE — FLOWSHEET NOTE
[facility]- Outpatient Rehabilitation and Therapy [address], [place] [address] office: [phone] fax: [phone]         Physical Therapy: TREATMENT/PROGRESS NOTE   Patient: Brien Lombardo (84 y.o. male)   Examination Date: 03/10/2025   :  1940 MRN: [id number]   Visit #: 24 (6 visits in   Insurance Allowable Auth Needed   Med nec []Yes    [x]No    Insurance: Payor: [employer] [place] MEDICAID / Plan: [employer] [place] MEDICA / Product Type: *No Product type* /   Insurance ID: [id number] - (Medicaid Managed)  Secondary Insurance (if applicable):    Treatment Diagnosis:     ICD-10-CM    1. Chronic bilateral low back pain, unspecified whether sciatica present  M54.50     G89.29          Medical Diagnosis:  Chronic bilateral low back pain without sciatica [M54.50, G89.29]   Referring Physician: Clint Caldwell MD  PCP: Clint Caldwell MD     Plan of care signed (Y/N):     Date of Patient follow up with Physician: N     Progress Report/POC: NO  POC update due: (10 visits /OR AUTH LIMITS, whichever is less)  3/12                                            Precautions/ Contra-indications:           Latex allergy:  NO  Pacemaker:    NO  Contraindications for Manipulation: unhealthy/ multiple comorbidities   Date of Surgery: n/a  Other: additional dx: PD (taking medication)    Red Flags:  None    C-SSRS Triggered by Intake questionnaire:   Patient answered 'NO' to both behavioral questions on intake.  No further screening warranted    Preferred Language for Healthcare:   [] English       [x] other: Lebanese  Interpretation provided by:  Hermelinda Yen   ID number: [id number]  For verbal instructions and entire follow up session  Waiver signed by family member/caregiver: no      SUBJECTIVE EXAMINATION     Patient stated complaint: Pt reports his back feels ok today.      Test used Initial score  5/29/24 03/10/2025   Pain Summary VAS 6-7 5-6/10   Functional questionnaire

## 2025-03-12 ENCOUNTER — APPOINTMENT (OUTPATIENT)
Dept: PHYSICAL THERAPY | Age: 85
End: 2025-03-12
Payer: MEDICAID

## 2025-03-17 ENCOUNTER — HOSPITAL ENCOUNTER (OUTPATIENT)
Dept: PHYSICAL THERAPY | Age: 85
Setting detail: THERAPIES SERIES
Discharge: HOME | End: 2025-03-17
Payer: MEDICAID

## 2025-03-17 PROCEDURE — 97012 MECHANICAL TRACTION THERAPY: CPT

## 2025-03-17 PROCEDURE — 97110 THERAPEUTIC EXERCISES: CPT

## 2025-03-17 PROCEDURE — 97112 NEUROMUSCULAR REEDUCATION: CPT

## 2025-03-17 NOTE — FLOWSHEET NOTE
descending steps.   [x] Progressing: [] Met: [] Not Met: [] Adjusted  4. Patient will return to modified ADL without increased symptoms or restriction.   [x] Progressing: [] Met: [] Not Met: [] Adjusted  5. Pt will exhibit improved balance of SLS >15 sec bilat. (patient specific functional goal)    [x] Progressing: [] Met: [] Not Met: [] Adjusted     Overall Progression Towards Functional goals/ Treatment Progress Update:  [] Patient is progressing as expected towards functional goals listed.    [x] Progression is slowed due to complexities/Impairments listed.  [] Progression has been slowed due to co-morbidities.  [] Plan just implemented, too soon (<30days) to assess goals progression   [] Goals require adjustment due to lack of progress  [] Patient is not progressing as expected and requires additional follow up with physician  [] Other:     TREATMENT PLAN       Plan: Cont POC- Continue emphasis/focus on exercise progression, modulating pain, and static and dynamic balance. Next visit plan to progress reps, add new exercises, progress balance, and continue traction.     Electronically Signed by Deborah Sky PT  Date: 03/17/2025     Note: Portions of this note have been templated and/or copied from initial evaluation, reassessments and prior notes for documentation efficiency.    Note: If patient does not return for scheduled/recommended follow up visits, this note will serve as a discharge from care along with the most recent update on progress.

## 2025-03-19 ENCOUNTER — APPOINTMENT (OUTPATIENT)
Dept: PHYSICAL THERAPY | Age: 85
End: 2025-03-19
Payer: MEDICAID

## 2025-03-26 ENCOUNTER — APPOINTMENT (OUTPATIENT)
Dept: PHYSICAL THERAPY | Age: 85
End: 2025-03-26
Payer: MEDICAID

## 2025-03-31 ENCOUNTER — HOSPITAL ENCOUNTER (OUTPATIENT)
Dept: PHYSICAL THERAPY | Age: 85
Setting detail: THERAPIES SERIES
Discharge: HOME | End: 2025-03-31
Payer: MEDICAID

## 2025-03-31 PROCEDURE — 97110 THERAPEUTIC EXERCISES: CPT

## 2025-03-31 PROCEDURE — 97112 NEUROMUSCULAR REEDUCATION: CPT

## 2025-03-31 PROCEDURE — 97012 MECHANICAL TRACTION THERAPY: CPT

## 2025-03-31 NOTE — FLOWSHEET NOTE
[facility]- Outpatient Rehabilitation and Therapy [address], [place] [address] office: [phone] fax: [phone]         Physical Therapy: TREATMENT/PROGRESS NOTE   Patient: Brien Lombardo (84 y.o. male)   Examination Date: 2025   :  1940 MRN: [id number]   Visit #: 26 (9 visits in )  Insurance Allowable Auth Needed   Med nec []Yes    [x]No    Insurance: Payor: [employer] [place] MEDICAID / Plan: [employer] [place] [employer] / Product Type: *No Product type* /   Insurance ID: [id number] - (Medicaid Managed)  Secondary Insurance (if applicable):    Treatment Diagnosis:     ICD-10-CM    1. Chronic bilateral low back pain, unspecified whether sciatica present  M54.50     G89.29          Medical Diagnosis:  Chronic bilateral low back pain without sciatica [M54.50, G89.29]   Referring Physician: Clint Caldwell MD  PCP: Clint Caldwell MD     Plan of care signed (Y/N):     Date of Patient follow up with Physician: N     Progress Report/POC: NO  POC update due: (10 visits /OR AUTH LIMITS, whichever is less)  3/12 NEXT VISIT                                            Precautions/ Contra-indications:           Latex allergy:  NO  Pacemaker:    NO  Contraindications for Manipulation: unhealthy/ multiple comorbidities   Date of Surgery: n/a  Other: additional dx: PD (taking medication)    Red Flags:  None    C-SSRS Triggered by Intake questionnaire:   Patient answered 'NO' to both behavioral questions on intake.  No further screening warranted    Preferred Language for Healthcare:   [] English       [] other: Kosovan (provided by family member today 317)      SUBJECTIVE EXAMINATION     Patient stated complaint: Pt reports he always feels good after traction for a few days.      Test used Initial score  2025   Pain Summary VAS 6-7 5-6/10   Functional questionnaire Modified [facility]    Other:                  OBJECTIVE EXAMINATION       Observation:

## 2025-04-03 LAB
ALBUMIN: 4.2 G/DL (ref 3.5–5.7)
ALP BLD-CCNC: 72 IU/L (ref 35–135)
ALT SERPL-CCNC: 20 IU/L (ref 10–60)
ANION GAP SERPL CALCULATED.3IONS-SCNC: 5 MMOL/L (ref 4–16)
AST SERPL-CCNC: 17 IU/L (ref 10–40)
BASOPHILS ABSOLUTE: 0.1 THOU/MCL (ref 0–0.2)
BASOPHILS ABSOLUTE: 1 %
BILIRUB SERPL-MCNC: 0.6 MG/DL (ref 0–1.2)
BUN BLDV-MCNC: 22 MG/DL (ref 8–26)
C-REACTIVE PROTEIN WIDE RANGE: <5 MG/L
CALCIUM SERPL-MCNC: 9.5 MG/DL (ref 8.5–10.4)
CHLORIDE BLD-SCNC: 104 MMOL/L (ref 98–111)
CO2: 30 MMOL/L (ref 21–31)
CREAT SERPL-MCNC: 1.34 MG/DL (ref 0.7–1.3)
EGFR (CKD-EPI): 53 ML/MIN/1.73 M2
EOSINOPHILS ABSOLUTE: 0.3 THOU/MCL (ref 0.03–0.45)
EOSINOPHILS RELATIVE PERCENT: 4 %
GLUCOSE BLD-MCNC: 100 MG/DL (ref 70–99)
HCT VFR BLD CALC: 47.3 % (ref 40–50)
HEMOGLOBIN: 16 G/DL (ref 13.5–16.5)
IMMUNOGLOBULIN A, SERUM: 290 MG/DL (ref 70–400)
LYMPHOCYTES ABSOLUTE: 2.1 THOU/MCL (ref 1–4)
LYMPHOCYTES RELATIVE PERCENT: 26 %
Lab: NEGATIVE NO UNITS
MCH RBC QN AUTO: 32.7 PG (ref 27–33)
MCHC RBC AUTO-ENTMCNC: 33.9 G/DL (ref 32–36)
MCV RBC AUTO: 96.3 FL (ref 82–97)
MONOCYTES ABSOLUTE: 0.6 THOU/MCL (ref 0.2–0.9)
MONOCYTES RELATIVE PERCENT: 7 %
NEUTROPHILS ABSOLUTE: 5 THOU/MCL (ref 1.8–7.7)
PDW BLD-RTO: 12.9 % (ref 12.3–17)
PLATELET # BLD: 268 THOU/MCL (ref 140–375)
PMV BLD AUTO: 7.7 FL (ref 7.4–11.5)
POTASSIUM SERPL-SCNC: 5.2 MMOL/L (ref 3.6–5.1)
RBC # BLD: 4.9 MIL/MCL (ref 4.4–5.8)
SEG NEUTROPHILS: 62 %
SODIUM BLD-SCNC: 139 MMOL/L (ref 135–145)
TISSUE TRANSGLUTAMINASE AB, IGA: <1.9 CU
TOTAL PROTEIN: 7.2 G/DL (ref 6–8)
WBC # BLD: 8 THOU/MCL (ref 3.6–10.5)